# Patient Record
Sex: MALE | Race: BLACK OR AFRICAN AMERICAN | NOT HISPANIC OR LATINO | Employment: FULL TIME | URBAN - METROPOLITAN AREA
[De-identification: names, ages, dates, MRNs, and addresses within clinical notes are randomized per-mention and may not be internally consistent; named-entity substitution may affect disease eponyms.]

---

## 2022-07-31 ENCOUNTER — OFFICE VISIT (OUTPATIENT)
Dept: URGENT CARE | Facility: CLINIC | Age: 42
End: 2022-07-31
Payer: COMMERCIAL

## 2022-07-31 VITALS
OXYGEN SATURATION: 98 % | HEART RATE: 91 BPM | BODY MASS INDEX: 42.66 KG/M2 | HEIGHT: 72 IN | SYSTOLIC BLOOD PRESSURE: 135 MMHG | DIASTOLIC BLOOD PRESSURE: 91 MMHG | WEIGHT: 315 LBS | RESPIRATION RATE: 18 BRPM | TEMPERATURE: 99 F

## 2022-07-31 DIAGNOSIS — I10 HYPERTENSION, UNSPECIFIED TYPE: ICD-10-CM

## 2022-07-31 DIAGNOSIS — Z76.0 PRESCRIPTION REFILL: ICD-10-CM

## 2022-07-31 PROCEDURE — 3080F DIAST BP >= 90 MM HG: CPT | Mod: CPTII,,, | Performed by: NURSE PRACTITIONER

## 2022-07-31 PROCEDURE — 3075F SYST BP GE 130 - 139MM HG: CPT | Mod: CPTII,,, | Performed by: NURSE PRACTITIONER

## 2022-07-31 PROCEDURE — 1159F PR MEDICATION LIST DOCUMENTED IN MEDICAL RECORD: ICD-10-PCS | Mod: CPTII,,, | Performed by: NURSE PRACTITIONER

## 2022-07-31 PROCEDURE — 1159F MED LIST DOCD IN RCRD: CPT | Mod: CPTII,,, | Performed by: NURSE PRACTITIONER

## 2022-07-31 PROCEDURE — 3075F PR MOST RECENT SYSTOLIC BLOOD PRESS GE 130-139MM HG: ICD-10-PCS | Mod: CPTII,,, | Performed by: NURSE PRACTITIONER

## 2022-07-31 PROCEDURE — 4010F ACE/ARB THERAPY RXD/TAKEN: CPT | Mod: CPTII,,, | Performed by: NURSE PRACTITIONER

## 2022-07-31 PROCEDURE — 1160F RVW MEDS BY RX/DR IN RCRD: CPT | Mod: CPTII,,, | Performed by: NURSE PRACTITIONER

## 2022-07-31 PROCEDURE — 99203 OFFICE O/P NEW LOW 30 MIN: CPT | Mod: ,,, | Performed by: NURSE PRACTITIONER

## 2022-07-31 PROCEDURE — 3008F PR BODY MASS INDEX (BMI) DOCUMENTED: ICD-10-PCS | Mod: CPTII,,, | Performed by: NURSE PRACTITIONER

## 2022-07-31 PROCEDURE — 1160F PR REVIEW ALL MEDS BY PRESCRIBER/CLIN PHARMACIST DOCUMENTED: ICD-10-PCS | Mod: CPTII,,, | Performed by: NURSE PRACTITIONER

## 2022-07-31 PROCEDURE — 4010F PR ACE/ARB THEARPY RXD/TAKEN: ICD-10-PCS | Mod: CPTII,,, | Performed by: NURSE PRACTITIONER

## 2022-07-31 PROCEDURE — 3080F PR MOST RECENT DIASTOLIC BLOOD PRESSURE >= 90 MM HG: ICD-10-PCS | Mod: CPTII,,, | Performed by: NURSE PRACTITIONER

## 2022-07-31 PROCEDURE — 99203 PR OFFICE/OUTPT VISIT, NEW, LEVL III, 30-44 MIN: ICD-10-PCS | Mod: ,,, | Performed by: NURSE PRACTITIONER

## 2022-07-31 PROCEDURE — 3008F BODY MASS INDEX DOCD: CPT | Mod: CPTII,,, | Performed by: NURSE PRACTITIONER

## 2022-07-31 RX ORDER — AMLODIPINE BESYLATE 10 MG/1
10 TABLET ORAL DAILY
Qty: 30 TABLET | Refills: 1 | Status: SHIPPED | OUTPATIENT
Start: 2022-07-31 | End: 2022-08-12 | Stop reason: SDUPTHER

## 2022-07-31 RX ORDER — LISINOPRIL AND HYDROCHLOROTHIAZIDE 20; 25 MG/1; MG/1
1 TABLET ORAL DAILY
Qty: 30 TABLET | Refills: 1 | Status: SHIPPED | OUTPATIENT
Start: 2022-07-31 | End: 2022-08-12 | Stop reason: SDUPTHER

## 2022-07-31 RX ORDER — AMLODIPINE BESYLATE 10 MG/1
10 TABLET ORAL DAILY
COMMUNITY
Start: 2022-07-14 | End: 2022-08-12 | Stop reason: SDUPTHER

## 2022-07-31 RX ORDER — LISINOPRIL AND HYDROCHLOROTHIAZIDE 20; 25 MG/1; MG/1
1 TABLET ORAL DAILY
COMMUNITY
Start: 2022-07-22 | End: 2022-08-12 | Stop reason: DRUGHIGH

## 2022-07-31 NOTE — PROGRESS NOTES
Subjective:       Patient ID: Tom Philip is a 42 y.o. male.    Vitals:  height is 6' (1.829 m) and weight is 147 kg (324 lb) (abnormal). His oral temperature is 98.7 °F (37.1 °C). His blood pressure is 135/91 (abnormal) and his pulse is 91. His respiration is 18 and oxygen saturation is 98%.     Chief Complaint: Medication Refill  Patient needs a refill on blood pressure medication. He ran out of current prescriptions yesterday.     Amlodipine 10mg   Lisinopril/HCTZ 20-25mg     42-year-old male presents for prescription refill.  History of hypertension.  Asymptomatic.  Plans to see PCP in the near future.    Medication Refill  The current episode started yesterday.     ROS    Objective:      Physical Exam   Constitutional: He is oriented to person, place, and time. He appears well-developed. He is cooperative.  Non-toxic appearance. He does not appear ill. No distress.   HENT:   Head: Normocephalic and atraumatic.   Ears:   Right Ear: Hearing, tympanic membrane, external ear and ear canal normal.   Left Ear: Hearing, tympanic membrane, external ear and ear canal normal.   Nose: Nose normal. No mucosal edema, rhinorrhea or nasal deformity. No epistaxis. Right sinus exhibits no maxillary sinus tenderness and no frontal sinus tenderness. Left sinus exhibits no maxillary sinus tenderness and no frontal sinus tenderness.   Mouth/Throat: Uvula is midline, oropharynx is clear and moist and mucous membranes are normal. No trismus in the jaw. Normal dentition. No uvula swelling. No posterior oropharyngeal erythema.   Eyes: Conjunctivae and lids are normal. Right eye exhibits no discharge. Left eye exhibits no discharge. No scleral icterus.   Neck: Trachea normal and phonation normal. Neck supple.   Cardiovascular: Normal rate, regular rhythm, normal heart sounds and normal pulses.   Pulmonary/Chest: Effort normal and breath sounds normal. No respiratory distress.   Abdominal: Normal appearance and bowel sounds are normal.  He exhibits no distension and no mass. Soft. There is no abdominal tenderness.   Musculoskeletal: Normal range of motion.         General: No deformity. Normal range of motion.   Neurological: He is alert and oriented to person, place, and time. He exhibits normal muscle tone. Coordination normal.   Skin: Skin is warm, dry, intact, not diaphoretic and not pale.   Psychiatric: His speech is normal and behavior is normal. Judgment and thought content normal.   Nursing note and vitals reviewed.        Assessment:       1. Prescription refill    2. Hypertension, unspecified type          Plan:     take prescription medication as directed.  Keep a blood pressure log and follow-up with your primary care provider for future prescription refills for hypertension.    Prescription refill  -     amLODIPine (NORVASC) 10 MG tablet; Take 1 tablet (10 mg total) by mouth once daily.  Dispense: 30 tablet; Refill: 1  -     lisinopriL-hydrochlorothiazide (PRINZIDE,ZESTORETIC) 20-25 mg Tab; Take 1 tablet by mouth once daily.  Dispense: 30 tablet; Refill: 1    Hypertension, unspecified type  -     amLODIPine (NORVASC) 10 MG tablet; Take 1 tablet (10 mg total) by mouth once daily.  Dispense: 30 tablet; Refill: 1  -     lisinopriL-hydrochlorothiazide (PRINZIDE,ZESTORETIC) 20-25 mg Tab; Take 1 tablet by mouth once daily.  Dispense: 30 tablet; Refill: 1

## 2022-07-31 NOTE — PATIENT INSTRUCTIONS
take prescription medication as directed.  Keep a blood pressure log and follow-up with your primary care provider for future prescription refills for hypertension.

## 2022-08-12 ENCOUNTER — APPOINTMENT (OUTPATIENT)
Dept: LAB | Facility: HOSPITAL | Age: 42
End: 2022-08-12
Attending: NURSE PRACTITIONER
Payer: COMMERCIAL

## 2022-08-12 ENCOUNTER — OFFICE VISIT (OUTPATIENT)
Dept: FAMILY MEDICINE | Facility: CLINIC | Age: 42
End: 2022-08-12
Payer: COMMERCIAL

## 2022-08-12 VITALS
RESPIRATION RATE: 18 BRPM | TEMPERATURE: 98 F | BODY MASS INDEX: 42.66 KG/M2 | HEIGHT: 72 IN | OXYGEN SATURATION: 97 % | HEART RATE: 96 BPM | DIASTOLIC BLOOD PRESSURE: 91 MMHG | SYSTOLIC BLOOD PRESSURE: 149 MMHG | WEIGHT: 315 LBS

## 2022-08-12 DIAGNOSIS — Z00.00 WELLNESS EXAMINATION: ICD-10-CM

## 2022-08-12 DIAGNOSIS — Z72.0 TOBACCO USER: ICD-10-CM

## 2022-08-12 DIAGNOSIS — I10 HYPERTENSION, UNSPECIFIED TYPE: Primary | ICD-10-CM

## 2022-08-12 DIAGNOSIS — Z11.3 SCREENING FOR STD (SEXUALLY TRANSMITTED DISEASE): ICD-10-CM

## 2022-08-12 DIAGNOSIS — I10 HYPERTENSION, UNSPECIFIED TYPE: ICD-10-CM

## 2022-08-12 DIAGNOSIS — M79.601 RIGHT ARM PAIN: ICD-10-CM

## 2022-08-12 PROBLEM — Z76.0 PRESCRIPTION REFILL: Status: RESOLVED | Noted: 2022-07-31 | Resolved: 2022-08-12

## 2022-08-12 LAB
ALBUMIN SERPL-MCNC: 4.3 GM/DL (ref 3.5–5)
ALBUMIN/GLOB SERPL: 1.3 RATIO (ref 1.1–2)
ALP SERPL-CCNC: 33 UNIT/L (ref 40–150)
ALT SERPL-CCNC: 39 UNIT/L (ref 0–55)
AST SERPL-CCNC: 33 UNIT/L (ref 5–34)
BASOPHILS # BLD AUTO: 0.05 X10(3)/MCL (ref 0–0.2)
BASOPHILS NFR BLD AUTO: 0.9 %
BILIRUBIN DIRECT+TOT PNL SERPL-MCNC: 0.8 MG/DL
BUN SERPL-MCNC: 12.8 MG/DL (ref 8.9–20.6)
CALCIUM SERPL-MCNC: 9.7 MG/DL (ref 8.4–10.2)
CHLORIDE SERPL-SCNC: 102 MMOL/L (ref 98–107)
CHOLEST SERPL-MCNC: 228 MG/DL
CHOLEST/HDLC SERPL: 7 {RATIO} (ref 0–5)
CO2 SERPL-SCNC: 27 MMOL/L (ref 22–29)
CREAT SERPL-MCNC: 0.86 MG/DL (ref 0.73–1.18)
EOSINOPHIL # BLD AUTO: 0.05 X10(3)/MCL (ref 0–0.9)
EOSINOPHIL NFR BLD AUTO: 0.9 %
ERYTHROCYTE [DISTWIDTH] IN BLOOD BY AUTOMATED COUNT: 11.7 % (ref 11.5–17)
EST. AVERAGE GLUCOSE BLD GHB EST-MCNC: 93.9 MG/DL
GFR SERPLBLD CREATININE-BSD FMLA CKD-EPI: >60 MLS/MIN/1.73/M2
GLOBULIN SER-MCNC: 3.4 GM/DL (ref 2.4–3.5)
GLUCOSE SERPL-MCNC: 106 MG/DL (ref 74–100)
HBA1C MFR BLD: 4.9 %
HBV CORE AB SERPL QL IA: NONREACTIVE
HBV SURFACE AG SERPL QL IA: NONREACTIVE
HCT VFR BLD AUTO: 43.6 % (ref 42–52)
HDLC SERPL-MCNC: 35 MG/DL (ref 35–60)
HGB BLD-MCNC: 15.1 GM/DL (ref 14–18)
HIV 1+2 AB+HIV1 P24 AG SERPL QL IA: NONREACTIVE
IMM GRANULOCYTES # BLD AUTO: 0.01 X10(3)/MCL (ref 0–0.04)
IMM GRANULOCYTES NFR BLD AUTO: 0.2 %
LDLC SERPL CALC-MCNC: 73 MG/DL (ref 50–140)
LYMPHOCYTES # BLD AUTO: 2.24 X10(3)/MCL (ref 0.6–4.6)
LYMPHOCYTES NFR BLD AUTO: 41.3 %
MCH RBC QN AUTO: 31.9 PG (ref 27–31)
MCHC RBC AUTO-ENTMCNC: 34.6 MG/DL (ref 33–36)
MCV RBC AUTO: 92.2 FL (ref 80–94)
MONOCYTES # BLD AUTO: 0.48 X10(3)/MCL (ref 0.1–1.3)
MONOCYTES NFR BLD AUTO: 8.9 %
NEUTROPHILS # BLD AUTO: 2.6 X10(3)/MCL (ref 2.1–9.2)
NEUTROPHILS NFR BLD AUTO: 47.8 %
NRBC BLD AUTO-RTO: 0 %
PLATELET # BLD AUTO: 241 X10(3)/MCL (ref 130–400)
PMV BLD AUTO: 9.8 FL (ref 7.4–10.4)
POTASSIUM SERPL-SCNC: 3.5 MMOL/L (ref 3.5–5.1)
PROT SERPL-MCNC: 7.7 GM/DL (ref 6.4–8.3)
RBC # BLD AUTO: 4.73 X10(6)/MCL (ref 4.7–6.1)
SODIUM SERPL-SCNC: 142 MMOL/L (ref 136–145)
TRIGL SERPL-MCNC: 600 MG/DL (ref 34–140)
TSH SERPL-ACNC: 0.88 UIU/ML (ref 0.35–4.94)
VLDLC SERPL CALC-MCNC: 120 MG/DL
WBC # SPEC AUTO: 5.4 X10(3)/MCL (ref 4.5–11.5)

## 2022-08-12 PROCEDURE — 3008F BODY MASS INDEX DOCD: CPT | Mod: CPTII,,, | Performed by: NURSE PRACTITIONER

## 2022-08-12 PROCEDURE — 86704 HEP B CORE ANTIBODY TOTAL: CPT

## 2022-08-12 PROCEDURE — 3080F PR MOST RECENT DIASTOLIC BLOOD PRESSURE >= 90 MM HG: ICD-10-PCS | Mod: CPTII,,, | Performed by: NURSE PRACTITIONER

## 2022-08-12 PROCEDURE — 80061 LIPID PANEL: CPT

## 2022-08-12 PROCEDURE — 1159F MED LIST DOCD IN RCRD: CPT | Mod: CPTII,,, | Performed by: NURSE PRACTITIONER

## 2022-08-12 PROCEDURE — 4010F PR ACE/ARB THEARPY RXD/TAKEN: ICD-10-PCS | Mod: CPTII,,, | Performed by: NURSE PRACTITIONER

## 2022-08-12 PROCEDURE — 3077F SYST BP >= 140 MM HG: CPT | Mod: CPTII,,, | Performed by: NURSE PRACTITIONER

## 2022-08-12 PROCEDURE — 80053 COMPREHEN METABOLIC PANEL: CPT

## 2022-08-12 PROCEDURE — 85025 COMPLETE CBC W/AUTO DIFF WBC: CPT

## 2022-08-12 PROCEDURE — 1159F PR MEDICATION LIST DOCUMENTED IN MEDICAL RECORD: ICD-10-PCS | Mod: CPTII,,, | Performed by: NURSE PRACTITIONER

## 2022-08-12 PROCEDURE — 3077F PR MOST RECENT SYSTOLIC BLOOD PRESSURE >= 140 MM HG: ICD-10-PCS | Mod: CPTII,,, | Performed by: NURSE PRACTITIONER

## 2022-08-12 PROCEDURE — 3080F DIAST BP >= 90 MM HG: CPT | Mod: CPTII,,, | Performed by: NURSE PRACTITIONER

## 2022-08-12 PROCEDURE — 1160F PR REVIEW ALL MEDS BY PRESCRIBER/CLIN PHARMACIST DOCUMENTED: ICD-10-PCS | Mod: CPTII,,, | Performed by: NURSE PRACTITIONER

## 2022-08-12 PROCEDURE — 87389 HIV-1 AG W/HIV-1&-2 AB AG IA: CPT

## 2022-08-12 PROCEDURE — 99214 PR OFFICE/OUTPT VISIT, EST, LEVL IV, 30-39 MIN: ICD-10-PCS | Mod: ,,, | Performed by: NURSE PRACTITIONER

## 2022-08-12 PROCEDURE — 4010F ACE/ARB THERAPY RXD/TAKEN: CPT | Mod: CPTII,,, | Performed by: NURSE PRACTITIONER

## 2022-08-12 PROCEDURE — 84443 ASSAY THYROID STIM HORMONE: CPT

## 2022-08-12 PROCEDURE — 99214 OFFICE O/P EST MOD 30 MIN: CPT | Mod: ,,, | Performed by: NURSE PRACTITIONER

## 2022-08-12 PROCEDURE — 36415 COLL VENOUS BLD VENIPUNCTURE: CPT

## 2022-08-12 PROCEDURE — 3008F PR BODY MASS INDEX (BMI) DOCUMENTED: ICD-10-PCS | Mod: CPTII,,, | Performed by: NURSE PRACTITIONER

## 2022-08-12 PROCEDURE — 1160F RVW MEDS BY RX/DR IN RCRD: CPT | Mod: CPTII,,, | Performed by: NURSE PRACTITIONER

## 2022-08-12 PROCEDURE — 87340 HEPATITIS B SURFACE AG IA: CPT

## 2022-08-12 PROCEDURE — 83036 HEMOGLOBIN GLYCOSYLATED A1C: CPT

## 2022-08-12 RX ORDER — MELOXICAM 15 MG/1
15 TABLET ORAL DAILY
Qty: 30 TABLET | Refills: 0 | Status: SHIPPED | OUTPATIENT
Start: 2022-08-12 | End: 2022-08-22 | Stop reason: SDUPTHER

## 2022-08-12 RX ORDER — IBUPROFEN 800 MG/1
800 TABLET ORAL EVERY 8 HOURS PRN
COMMUNITY
Start: 2022-05-02 | End: 2022-08-22 | Stop reason: ALTCHOICE

## 2022-08-12 RX ORDER — AMLODIPINE BESYLATE 10 MG/1
10 TABLET ORAL DAILY
Qty: 90 TABLET | Refills: 1 | Status: SHIPPED | OUTPATIENT
Start: 2022-08-12 | End: 2023-04-24 | Stop reason: SDUPTHER

## 2022-08-12 RX ORDER — LISINOPRIL AND HYDROCHLOROTHIAZIDE 12.5; 2 MG/1; MG/1
2 TABLET ORAL DAILY
Qty: 180 TABLET | Refills: 1 | Status: SHIPPED | OUTPATIENT
Start: 2022-08-12 | End: 2023-02-14

## 2022-08-12 NOTE — ASSESSMENT & PLAN NOTE
BP elevated; Asymptomatic   Currently taking Amlodipine 10 mg po daily and Lisinopril/HCTZ 20-12.5 mg po daily.  Continue Amlodipine  Increase Lisinopril/HCTZ to 20/12.5 (two tablets) daily  Instructed to take BP meds prior to all clinic appointments  Instructed to report to ED for any BP >200/100, SOB, CP, and/or worsening symptoms  Daily BP check; bring log to all appointments  F/U in 1 week for reevaluation/wellness  Verbalizes all understanding

## 2022-08-12 NOTE — PROGRESS NOTES
Subjective:      Patient ID: Tom Philip is a 42 y.o. Black or  male      Chief Complaint: Follow-up (F/u visit, rt arm issue)       Past Medical History:   Diagnosis Date    Hypertension         HPI  Presents to clinic for evaluation.  Wishes to establish care with [facility] provider.      HTN:  HTN for many years.  BP elevated today. Currently taking Amlodipine 10 mg po daily and Lisinopril/HCTZ 20-12.5 mg po daily.  States he has been taking BP medication x 5 years and states DBP stays in 90's, without any improvement.  Denies any headaches, weakness, dizziness, CP, or SOB.        Arm pain:  C/O of right forearm pain, near elbow, x 2 months.  Describes as stiffness first thing in the morning.  Denies any numbness or tingling.  He is requesting anti-inflammatory.      Tobacco User:  Smokes cigarettes; 1 pack per week.  Denies any wheezing, cough, or SOB.    Morbid Obesity:  States he exercises on weekend.          Review of Systems   Constitutional: Negative for chills, fatigue, fever and unexpected weight change.   HENT: Negative.    Eyes: Negative.    Respiratory: Negative.  Negative for shortness of breath.    Cardiovascular: Negative.  Negative for chest pain.   Gastrointestinal: Negative.    Endocrine: Negative.    Genitourinary: Negative.    Musculoskeletal: Negative.    Integumentary:  Negative.   Allergic/Immunologic: Negative.    Neurological: Negative.  Negative for weakness.   Hematological: Negative.    Psychiatric/Behavioral: Negative.    All other systems reviewed and are negative.         Objective:      Vitals:    08/12/22 0933   BP: (!) 149/91   Pulse: 96   Resp:    Temp:       Body mass index is 44.29 kg/m².     Physical Exam  Vitals reviewed.   Constitutional:       Appearance: He is not toxic-appearing.   HENT:      Head: Normocephalic.      Mouth/Throat:      Mouth: Mucous membranes are moist.   Eyes:      Extraocular Movements: Extraocular movements intact.      Pupils: Pupils  are equal, round, and reactive to light.   Cardiovascular:      Rate and Rhythm: Normal rate and regular rhythm.      Pulses: Normal pulses.      Heart sounds: Normal heart sounds.   Pulmonary:      Effort: Pulmonary effort is normal. No respiratory distress.      Breath sounds: Normal breath sounds.   Abdominal:      General: Bowel sounds are normal. There is no distension.      Palpations: Abdomen is soft.      Tenderness: There is no abdominal tenderness.   Musculoskeletal:         General: No tenderness. Normal range of motion.      Cervical back: Neck supple.   Skin:     General: Skin is warm and dry.   Neurological:      Mental Status: He is alert and oriented to person, place, and time.   Psychiatric:         Mood and Affect: Mood normal.            Current Outpatient Medications:     ibuprofen (ADVIL,MOTRIN) 800 MG tablet, Take 800 mg by mouth every 8 (eight) hours as needed., Disp: , Rfl:     amLODIPine (NORVASC) 10 MG tablet, Take 1 tablet (10 mg total) by mouth once daily., Disp: 90 tablet, Rfl: 1    lisinopriL-hydrochlorothiazide (PRINZIDE,ZESTORETIC) 20-12.5 mg per tablet, Take 2 tablets by mouth once daily., Disp: 180 tablet, Rfl: 1    meloxicam (MOBIC) 15 MG tablet, Take 1 tablet (15 mg total) by mouth once daily., Disp: 30 tablet, Rfl: 0    Assessment & Plan:     Problem List Items Addressed This Visit        Cardiac/Vascular    HTN (hypertension) - Primary     BP elevated; Asymptomatic   Currently taking Amlodipine 10 mg po daily and Lisinopril/HCTZ 20-12.5 mg po daily.  Continue Amlodipine  Increase Lisinopril/HCTZ to 20/12.5 (two tablets) daily  Instructed to take BP meds prior to all clinic appointments  Instructed to report to ED for any BP >200/100, SOB, CP, and/or worsening symptoms  Daily BP check; bring log to all appointments  F/U in 1 week for reevaluation/wellness  Verbalizes all understanding             Relevant Medications    lisinopriL-hydrochlorothiazide (PRINZIDE,ZESTORETIC)  20-12.5 mg per tablet    amLODIPine (NORVASC) 10 MG tablet    Other Relevant Orders    CBC Auto Differential    Comprehensive Metabolic Panel    TSH    Urinalysis, Reflex to Urine Culture Urine, Clean Catch    Hemoglobin A1C    Lipid Panel       ID    Screening for STD (sexually transmitted disease)     Labs today           Relevant Orders    HIV 1/2 Ag/Ab (4th Gen)    Hepatitis B Surface Antigen    Hepatitis B Core Antibody, Total    C.trach/N.gonor AMP RNA, (Non-Genital)       Orthopedic    Right arm pain     Rx Meloxicam   F/U if no improvement           Relevant Medications    meloxicam (MOBIC) 15 MG tablet       Other    Tobacco user     Educated on smoking cessation           Relevant Orders    HIV 1/2 Ag/Ab (4th Gen)    Hepatitis B Surface Antigen    Hepatitis B Core Antibody, Total    C.trach/N.gonor AMP RNA, (Non-Genital)      Other Visit Diagnoses     Wellness examination

## 2022-08-22 ENCOUNTER — OFFICE VISIT (OUTPATIENT)
Dept: FAMILY MEDICINE | Facility: CLINIC | Age: 42
End: 2022-08-22
Payer: COMMERCIAL

## 2022-08-22 VITALS
BODY MASS INDEX: 42.66 KG/M2 | HEART RATE: 88 BPM | OXYGEN SATURATION: 97 % | HEIGHT: 72 IN | WEIGHT: 315 LBS | SYSTOLIC BLOOD PRESSURE: 150 MMHG | TEMPERATURE: 98 F | RESPIRATION RATE: 18 BRPM | DIASTOLIC BLOOD PRESSURE: 92 MMHG

## 2022-08-22 DIAGNOSIS — I10 HYPERTENSION, UNSPECIFIED TYPE: ICD-10-CM

## 2022-08-22 DIAGNOSIS — Z72.0 TOBACCO USER: ICD-10-CM

## 2022-08-22 DIAGNOSIS — E66.01 MORBID OBESITY: ICD-10-CM

## 2022-08-22 DIAGNOSIS — E78.1 HYPERTRIGLYCERIDEMIA: ICD-10-CM

## 2022-08-22 DIAGNOSIS — Z00.00 WELLNESS EXAMINATION: Primary | ICD-10-CM

## 2022-08-22 DIAGNOSIS — M79.601 RIGHT ARM PAIN: ICD-10-CM

## 2022-08-22 DIAGNOSIS — E78.5 HYPERLIPIDEMIA, UNSPECIFIED HYPERLIPIDEMIA TYPE: ICD-10-CM

## 2022-08-22 PROCEDURE — 3080F DIAST BP >= 90 MM HG: CPT | Mod: CPTII,,, | Performed by: NURSE PRACTITIONER

## 2022-08-22 PROCEDURE — 1160F PR REVIEW ALL MEDS BY PRESCRIBER/CLIN PHARMACIST DOCUMENTED: ICD-10-PCS | Mod: CPTII,,, | Performed by: NURSE PRACTITIONER

## 2022-08-22 PROCEDURE — 3077F PR MOST RECENT SYSTOLIC BLOOD PRESSURE >= 140 MM HG: ICD-10-PCS | Mod: CPTII,,, | Performed by: NURSE PRACTITIONER

## 2022-08-22 PROCEDURE — 99396 PREV VISIT EST AGE 40-64: CPT | Mod: ,,, | Performed by: NURSE PRACTITIONER

## 2022-08-22 PROCEDURE — 1160F RVW MEDS BY RX/DR IN RCRD: CPT | Mod: CPTII,,, | Performed by: NURSE PRACTITIONER

## 2022-08-22 PROCEDURE — 3077F SYST BP >= 140 MM HG: CPT | Mod: CPTII,,, | Performed by: NURSE PRACTITIONER

## 2022-08-22 PROCEDURE — 1159F MED LIST DOCD IN RCRD: CPT | Mod: CPTII,,, | Performed by: NURSE PRACTITIONER

## 2022-08-22 PROCEDURE — 1159F PR MEDICATION LIST DOCUMENTED IN MEDICAL RECORD: ICD-10-PCS | Mod: CPTII,,, | Performed by: NURSE PRACTITIONER

## 2022-08-22 PROCEDURE — 4010F PR ACE/ARB THEARPY RXD/TAKEN: ICD-10-PCS | Mod: CPTII,,, | Performed by: NURSE PRACTITIONER

## 2022-08-22 PROCEDURE — 99396 PR PREVENTIVE VISIT,EST,40-64: ICD-10-PCS | Mod: ,,, | Performed by: NURSE PRACTITIONER

## 2022-08-22 PROCEDURE — 4010F ACE/ARB THERAPY RXD/TAKEN: CPT | Mod: CPTII,,, | Performed by: NURSE PRACTITIONER

## 2022-08-22 PROCEDURE — 3008F BODY MASS INDEX DOCD: CPT | Mod: CPTII,,, | Performed by: NURSE PRACTITIONER

## 2022-08-22 PROCEDURE — 3080F PR MOST RECENT DIASTOLIC BLOOD PRESSURE >= 90 MM HG: ICD-10-PCS | Mod: CPTII,,, | Performed by: NURSE PRACTITIONER

## 2022-08-22 PROCEDURE — 3008F PR BODY MASS INDEX (BMI) DOCUMENTED: ICD-10-PCS | Mod: CPTII,,, | Performed by: NURSE PRACTITIONER

## 2022-08-22 RX ORDER — METOPROLOL SUCCINATE 25 MG/1
25 TABLET, EXTENDED RELEASE ORAL DAILY
Qty: 90 TABLET | Refills: 1 | Status: SHIPPED | OUTPATIENT
Start: 2022-08-22 | End: 2023-06-12 | Stop reason: SDUPTHER

## 2022-08-22 RX ORDER — MELOXICAM 15 MG/1
15 TABLET ORAL DAILY
Qty: 30 TABLET | Refills: 0 | Status: SHIPPED | OUTPATIENT
Start: 2022-08-22 | End: 2022-09-21

## 2022-08-22 RX ORDER — ATORVASTATIN CALCIUM 40 MG/1
40 TABLET, FILM COATED ORAL DAILY
Qty: 90 TABLET | Refills: 1 | Status: SHIPPED | OUTPATIENT
Start: 2022-08-22 | End: 2023-06-26 | Stop reason: SDUPTHER

## 2022-08-22 NOTE — ASSESSMENT & PLAN NOTE
Total cholesterol 228; Triglycerides 600; LDL 73.   16.7% 10-year risk of heart attack and stroke per ACC/AHA guidelines; Needs high intensity statin  Rx Atorvastatin 40 mg po daily  Start OTC Fish Oil 1200 mg BID  Repeat labs in 3 months;

## 2022-08-22 NOTE — ASSESSMENT & PLAN NOTE
Labs previously done and reviewed with patient; U/A not done  Covid-19 vaccine (2021); Booster due  Tdap; due; Refuses  Colon cancer screening:  Due next year  Alcohol use:  Drink 3 beers daily; Fany on weekends  Tobacco User:  Smokes cigarettes; 1 pack per week.  Denies any wheezing, cough, or SOB  Morbid Obesity:  States he exercises on weekend

## 2022-08-22 NOTE — ASSESSMENT & PLAN NOTE
Elevated; Asymptomatic  Currently taking Amlodipine 10 mg po daily and Lisinopril/HCTZ 20-12.5 mg (two tablets) po daily  Continue Amlodipine and Lisinopril/HCTZ  Rx Metoprolol ER 25 mg po daily  Daily BPcheck  Instructed to report to ED for any BP >200/100, SOB, CP, and/or worsening symptoms  Keep appt for follow up  Verbalizes all understanding

## 2022-08-22 NOTE — PROGRESS NOTES
Subjective:      Patient ID: Tom Philip is a 42 y.o. Black or  male      Chief Complaint: Follow-up (wellness)       Past Medical History:   Diagnosis Date    HLD (hyperlipidemia)     Hypertension     Hypertriglyceridemia     Morbid obesity         HPI  Presents to clinic for Annual Wellness.  Wishes to establish care with [facility] provider.     Covid-19 vaccine (2021); Booster due  Tdap; due  Colon cancer screening:  Due next year  Alcohol use:  Drink 3 beers daily; Fany on weekends  Tobacco User:  Smokes cigarettes; 1 pack per week.  Denies any wheezing, cough, or SOB  Morbid Obesity:  States he exercises on weekend  Labs previously done and reviewed; U/A not done     HTN:  HTN for many years.  BP elevated at previous visit.  Medication changes were made. Currently taking Amlodipine 10 mg po daily and Lisinopril/HCTZ 20-12.5 mg (two tablets) po daily.  Denies any headaches, weakness, dizziness, CP, or SOB.         HDL/Triglycerides: Total cholesterol 228; Triglycerides 600; LDL 73.  16.7% 10-year risk of heart attack and stroke per ACC/AHA guidelines; Needs high intensity statin    Arm pain:  Improved with Meloxicam.  Desires refills.        Review of Systems   Constitutional: Negative for chills, fatigue, fever and unexpected weight change.   HENT: Negative.    Eyes: Negative.    Respiratory: Negative.  Negative for shortness of breath.    Cardiovascular: Negative.  Negative for chest pain.   Gastrointestinal: Negative.    Endocrine: Negative.    Genitourinary: Negative.    Musculoskeletal: Negative.    Integumentary:  Negative.   Allergic/Immunologic: Negative.    Neurological: Negative.  Negative for weakness.   Hematological: Negative.    Psychiatric/Behavioral: Negative.    All other systems reviewed and are negative.         Objective:      Vitals:    08/22/22 1451   BP: (!) 150/92   Pulse:    Resp:    Temp:       Body mass index is 44.34 kg/m².     Physical Exam  Vitals reviewed.    Constitutional:       Appearance: He is not toxic-appearing.   HENT:      Head: Normocephalic.      Mouth/Throat:      Mouth: Mucous membranes are moist.   Eyes:      Extraocular Movements: Extraocular movements intact.      Pupils: Pupils are equal, round, and reactive to light.   Cardiovascular:      Rate and Rhythm: Normal rate and regular rhythm.      Pulses: Normal pulses.      Heart sounds: Normal heart sounds.   Pulmonary:      Effort: Pulmonary effort is normal. No respiratory distress.      Breath sounds: Normal breath sounds.   Abdominal:      General: Bowel sounds are normal. There is no distension.      Palpations: Abdomen is soft.      Tenderness: There is no abdominal tenderness.   Musculoskeletal:         General: No tenderness. Normal range of motion.      Cervical back: Neck supple.   Skin:     General: Skin is warm and dry.   Neurological:      Mental Status: He is alert and oriented to person, place, and time.   Psychiatric:         Mood and Affect: Mood normal.            Current Outpatient Medications:     amLODIPine (NORVASC) 10 MG tablet, Take 1 tablet (10 mg total) by mouth once daily., Disp: 90 tablet, Rfl: 1    lisinopriL-hydrochlorothiazide (PRINZIDE,ZESTORETIC) 20-12.5 mg per tablet, Take 2 tablets by mouth once daily., Disp: 180 tablet, Rfl: 1    atorvastatin (LIPITOR) 40 MG tablet, Take 1 tablet (40 mg total) by mouth once daily., Disp: 90 tablet, Rfl: 1    meloxicam (MOBIC) 15 MG tablet, Take 1 tablet (15 mg total) by mouth once daily., Disp: 30 tablet, Rfl: 0    metoprolol succinate (TOPROL-XL) 25 MG 24 hr tablet, Take 1 tablet (25 mg total) by mouth once daily., Disp: 90 tablet, Rfl: 1    Assessment & Plan:     Problem List Items Addressed This Visit        Cardiac/Vascular    Hypertension     Elevated; Asymptomatic  Currently taking Amlodipine 10 mg po daily and Lisinopril/HCTZ 20-12.5 mg (two tablets) po daily  Continue Amlodipine and Lisinopril/HCTZ  Rx Metoprolol ER 25 mg  po daily  Daily BPcheck  Instructed to report to ED for any BP >200/100, SOB, CP, and/or worsening symptoms  Keep appt for follow up  Verbalizes all understanding             Relevant Medications    metoprolol succinate (TOPROL-XL) 25 MG 24 hr tablet    Hypertriglyceridemia     Total cholesterol 228; Triglycerides 600; LDL 73.   16.7% 10-year risk of heart attack and stroke per ACC/AHA guidelines; Needs high intensity statin  Rx Atorvastatin 40 mg po daily  Start OTC Fish Oil 1200 mg BID  Repeat labs in 3 months;             Relevant Medications    atorvastatin (LIPITOR) 40 MG tablet    Other Relevant Orders    Comprehensive Metabolic Panel    Lipid Panel    Hyperlipidemia     Total cholesterol 228; Triglycerides 600; LDL 73.   16.7% 10-year risk of heart attack and stroke per ACC/AHA guidelines; Needs high intensity statin  Rx Atorvastatin 40 mg po daily  Start OTC Fish Oil 1200 mg BID  Repeat labs in 3 months;           Relevant Medications    atorvastatin (LIPITOR) 40 MG tablet    Other Relevant Orders    Comprehensive Metabolic Panel    Lipid Panel       Endocrine    Morbid obesity     Educated on aerobic exercise 4-5 times per day x 40 minutes per day              Orthopedic    Right arm pain     Improved  Continue Mobic prn           Relevant Medications    meloxicam (MOBIC) 15 MG tablet       Other    Tobacco user     Educated on smoking cessation  Refuses class           Wellness examination - Primary     Labs previously done and reviewed with patient; U/A not done  Covid-19 vaccine (2021); Booster due  Tdap; due; Refuses  Colon cancer screening:  Due next year  Alcohol use:  Drink 3 beers daily; Fany on weekends  Tobacco User:  Smokes cigarettes; 1 pack per week.  Denies any wheezing, cough, or SOB  Morbid Obesity:  States he exercises on weekend

## 2022-11-21 PROBLEM — Z00.00 WELLNESS EXAMINATION: Status: RESOLVED | Noted: 2022-08-22 | Resolved: 2022-11-21

## 2023-04-24 ENCOUNTER — TELEPHONE (OUTPATIENT)
Dept: FAMILY MEDICINE | Facility: CLINIC | Age: 43
End: 2023-04-24
Payer: COMMERCIAL

## 2023-04-24 DIAGNOSIS — I10 HYPERTENSION, UNSPECIFIED TYPE: ICD-10-CM

## 2023-04-24 RX ORDER — AMLODIPINE BESYLATE 10 MG/1
10 TABLET ORAL DAILY
Qty: 90 TABLET | Refills: 1 | Status: SHIPPED | OUTPATIENT
Start: 2023-04-24 | End: 2023-06-12 | Stop reason: SDUPTHER

## 2023-05-19 DIAGNOSIS — I10 HYPERTENSION, UNSPECIFIED TYPE: ICD-10-CM

## 2023-05-19 RX ORDER — LISINOPRIL AND HYDROCHLOROTHIAZIDE 12.5; 2 MG/1; MG/1
2 TABLET ORAL DAILY
Qty: 60 TABLET | Refills: 0 | Status: SHIPPED | OUTPATIENT
Start: 2023-05-19 | End: 2023-06-09

## 2023-06-09 DIAGNOSIS — I10 HYPERTENSION, UNSPECIFIED TYPE: ICD-10-CM

## 2023-06-09 RX ORDER — LISINOPRIL AND HYDROCHLOROTHIAZIDE 12.5; 2 MG/1; MG/1
2 TABLET ORAL DAILY
Qty: 60 TABLET | Refills: 0 | Status: SHIPPED | OUTPATIENT
Start: 2023-06-09 | End: 2023-06-12 | Stop reason: SDUPTHER

## 2023-06-12 ENCOUNTER — OFFICE VISIT (OUTPATIENT)
Dept: FAMILY MEDICINE | Facility: CLINIC | Age: 43
End: 2023-06-12
Payer: COMMERCIAL

## 2023-06-12 VITALS
HEIGHT: 72 IN | BODY MASS INDEX: 42.66 KG/M2 | RESPIRATION RATE: 18 BRPM | TEMPERATURE: 99 F | SYSTOLIC BLOOD PRESSURE: 150 MMHG | DIASTOLIC BLOOD PRESSURE: 92 MMHG | OXYGEN SATURATION: 96 % | WEIGHT: 315 LBS | HEART RATE: 86 BPM

## 2023-06-12 DIAGNOSIS — I10 PRIMARY HYPERTENSION: Primary | ICD-10-CM

## 2023-06-12 DIAGNOSIS — I10 HYPERTENSION, UNSPECIFIED TYPE: ICD-10-CM

## 2023-06-12 DIAGNOSIS — Z12.11 COLON CANCER SCREENING: ICD-10-CM

## 2023-06-12 PROCEDURE — 1160F PR REVIEW ALL MEDS BY PRESCRIBER/CLIN PHARMACIST DOCUMENTED: ICD-10-PCS | Mod: CPTII,,, | Performed by: NURSE PRACTITIONER

## 2023-06-12 PROCEDURE — 3077F SYST BP >= 140 MM HG: CPT | Mod: CPTII,,, | Performed by: NURSE PRACTITIONER

## 2023-06-12 PROCEDURE — 4010F ACE/ARB THERAPY RXD/TAKEN: CPT | Mod: CPTII,,, | Performed by: NURSE PRACTITIONER

## 2023-06-12 PROCEDURE — 99214 OFFICE O/P EST MOD 30 MIN: CPT | Mod: ,,, | Performed by: NURSE PRACTITIONER

## 2023-06-12 PROCEDURE — 1159F PR MEDICATION LIST DOCUMENTED IN MEDICAL RECORD: ICD-10-PCS | Mod: CPTII,,, | Performed by: NURSE PRACTITIONER

## 2023-06-12 PROCEDURE — 99214 PR OFFICE/OUTPT VISIT, EST, LEVL IV, 30-39 MIN: ICD-10-PCS | Mod: ,,, | Performed by: NURSE PRACTITIONER

## 2023-06-12 PROCEDURE — 3080F DIAST BP >= 90 MM HG: CPT | Mod: CPTII,,, | Performed by: NURSE PRACTITIONER

## 2023-06-12 PROCEDURE — 3008F PR BODY MASS INDEX (BMI) DOCUMENTED: ICD-10-PCS | Mod: CPTII,,, | Performed by: NURSE PRACTITIONER

## 2023-06-12 PROCEDURE — 1160F RVW MEDS BY RX/DR IN RCRD: CPT | Mod: CPTII,,, | Performed by: NURSE PRACTITIONER

## 2023-06-12 PROCEDURE — 3077F PR MOST RECENT SYSTOLIC BLOOD PRESSURE >= 140 MM HG: ICD-10-PCS | Mod: CPTII,,, | Performed by: NURSE PRACTITIONER

## 2023-06-12 PROCEDURE — 1159F MED LIST DOCD IN RCRD: CPT | Mod: CPTII,,, | Performed by: NURSE PRACTITIONER

## 2023-06-12 PROCEDURE — 3080F PR MOST RECENT DIASTOLIC BLOOD PRESSURE >= 90 MM HG: ICD-10-PCS | Mod: CPTII,,, | Performed by: NURSE PRACTITIONER

## 2023-06-12 PROCEDURE — 4010F PR ACE/ARB THEARPY RXD/TAKEN: ICD-10-PCS | Mod: CPTII,,, | Performed by: NURSE PRACTITIONER

## 2023-06-12 PROCEDURE — 3008F BODY MASS INDEX DOCD: CPT | Mod: CPTII,,, | Performed by: NURSE PRACTITIONER

## 2023-06-12 RX ORDER — CLONIDINE HYDROCHLORIDE 0.1 MG/1
0.1 TABLET ORAL
Status: COMPLETED | OUTPATIENT
Start: 2023-06-12 | End: 2023-06-12

## 2023-06-12 RX ORDER — LISINOPRIL AND HYDROCHLOROTHIAZIDE 12.5; 2 MG/1; MG/1
2 TABLET ORAL DAILY
Qty: 180 TABLET | Refills: 1 | Status: SHIPPED | OUTPATIENT
Start: 2023-06-12 | End: 2023-08-14 | Stop reason: SDUPTHER

## 2023-06-12 RX ORDER — METOPROLOL SUCCINATE 25 MG/1
25 TABLET, EXTENDED RELEASE ORAL DAILY
Qty: 90 TABLET | Refills: 0 | Status: SHIPPED | OUTPATIENT
Start: 2023-06-12 | End: 2023-06-26 | Stop reason: SDUPTHER

## 2023-06-12 RX ORDER — AMLODIPINE BESYLATE 10 MG/1
10 TABLET ORAL DAILY
Qty: 90 TABLET | Refills: 1 | Status: SHIPPED | OUTPATIENT
Start: 2023-06-12 | End: 2023-08-14 | Stop reason: SDUPTHER

## 2023-06-12 RX ADMIN — CLONIDINE HYDROCHLORIDE 0.1 MG: 0.1 TABLET ORAL at 10:06

## 2023-06-12 NOTE — PROGRESS NOTES
Subjective:      Patient ID: Tom Philip is a 43 y.o. Black or  male      Chief Complaint: Medication Refill (Lisinopril-hydrochlorothiazide, Amlodipine)       Past Medical History:   Diagnosis Date    HLD (hyperlipidemia)     Hypertension     Hypertriglyceridemia     Morbid obesity         HPI  Presents to clinic for follow up HTN/Medication refills.    HTN:  HTN for many years.  BP elevated today and at previous visit.  States 's/90's a home.  Took BP meds right before appt.  Currently taking Amlodipine 10 mg po daily and Lisinopril/HCTZ 20-12.5 mg (two tablets) po daily.  He did not start Metoprolol at previously prescribed.  States compliance with medications.  Denies any headaches, weakness, dizziness, CP, or SOB.        Colon cancer screening due; Cologuard ordered      Review of Systems   Constitutional:  Negative for chills, fatigue, fever and unexpected weight change.   HENT: Negative.     Eyes: Negative.    Respiratory: Negative.  Negative for shortness of breath.    Cardiovascular: Negative.  Negative for chest pain.   Gastrointestinal: Negative.    Endocrine: Negative.    Genitourinary: Negative.    Musculoskeletal: Negative.    Integumentary:  Negative.   Allergic/Immunologic: Negative.    Neurological: Negative.  Negative for weakness.   Hematological: Negative.    Psychiatric/Behavioral: Negative.     All other systems reviewed and are negative.       Objective:      Vitals:    06/12/23 1043   BP: (!) 150/92   Pulse:    Resp:    Temp:       Body mass index is 42.76 kg/m².     Physical Exam  Vitals reviewed.   Constitutional:       Appearance: He is not toxic-appearing.   HENT:      Head: Normocephalic.      Mouth/Throat:      Mouth: Mucous membranes are moist.   Eyes:      Extraocular Movements: Extraocular movements intact.      Pupils: Pupils are equal, round, and reactive to light.   Cardiovascular:      Rate and Rhythm: Normal rate and regular rhythm.      Pulses: Normal  pulses.      Heart sounds: Normal heart sounds.   Pulmonary:      Effort: Pulmonary effort is normal. No respiratory distress.      Breath sounds: Normal breath sounds.   Abdominal:      General: Bowel sounds are normal. There is no distension.      Palpations: Abdomen is soft.      Tenderness: There is no abdominal tenderness.   Musculoskeletal:         General: No tenderness. Normal range of motion.      Cervical back: Neck supple.   Skin:     General: Skin is warm and dry.   Neurological:      Mental Status: He is alert and oriented to person, place, and time.   Psychiatric:         Mood and Affect: Mood normal.          Current Outpatient Medications:     atorvastatin (LIPITOR) 40 MG tablet, Take 1 tablet (40 mg total) by mouth once daily., Disp: 90 tablet, Rfl: 1    amLODIPine (NORVASC) 10 MG tablet, Take 1 tablet (10 mg total) by mouth once daily., Disp: 90 tablet, Rfl: 1    lisinopriL-hydrochlorothiazide (PRINZIDE,ZESTORETIC) 20-12.5 mg per tablet, Take 2 tablets by mouth once daily., Disp: 180 tablet, Rfl: 1    metoprolol succinate (TOPROL-XL) 25 MG 24 hr tablet, Take 1 tablet (25 mg total) by mouth once daily., Disp: 90 tablet, Rfl: 0  No current facility-administered medications for this visit.    Assessment & Plan:     Problem List Items Addressed This Visit          Cardiac/Vascular    HTN (hypertension) - Primary     BP elevated; Asymptomatic  Took BP meds right before appt  Clonidine 0.1 mg given in office; BP repeated in 30 minutes    Currently taking Amlodipine 10 mg po daily and Lisinopril/HCTZ 20-12.5 mg (two tablets) po daily.  He did not start Metoprolol at previously prescribed  Continue Amlodipine and Lisinopril/HCTZ as prescribed  Rx Metoprolol XL 25 mg po daily  Instructed to take BP meds(few hours) prior to all clinic appointments  Instructed to report to ED for any BP >200/100, SOB, CP, and/or worsening symptoms  F/U in 1 month  Daily BP check; bring log to all appointments  Keep appt for  follow up  Verbalizes all understanding           Relevant Medications    amLODIPine (NORVASC) 10 MG tablet    lisinopriL-hydrochlorothiazide (PRINZIDE,ZESTORETIC) 20-12.5 mg per tablet    metoprolol succinate (TOPROL-XL) 25 MG 24 hr tablet    cloNIDine tablet 0.1 mg (Completed)    Hypertension    Relevant Medications    amLODIPine (NORVASC) 10 MG tablet    lisinopriL-hydrochlorothiazide (PRINZIDE,ZESTORETIC) 20-12.5 mg per tablet    metoprolol succinate (TOPROL-XL) 25 MG 24 hr tablet       GI    Colon cancer screening    Relevant Orders    Cologuard Screening (Multitarget Stool DNA)

## 2023-06-12 NOTE — ASSESSMENT & PLAN NOTE
BP elevated; Asymptomatic  Took BP meds right before appt  Clonidine 0.1 mg given in office; BP repeated in 30 minutes    Currently taking Amlodipine 10 mg po daily and Lisinopril/HCTZ 20-12.5 mg (two tablets) po daily.  He did not start Metoprolol at previously prescribed  Continue Amlodipine and Lisinopril/HCTZ as prescribed  Rx Metoprolol XL 25 mg po daily  Instructed to take BP meds(few hours) prior to all clinic appointments  Instructed to report to ED for any BP >200/100, SOB, CP, and/or worsening symptoms  F/U in 1 month  Daily BP check; bring log to all appointments  Keep appt for follow up  Verbalizes all understanding

## 2023-06-26 ENCOUNTER — OFFICE VISIT (OUTPATIENT)
Dept: FAMILY MEDICINE | Facility: CLINIC | Age: 43
End: 2023-06-26
Payer: COMMERCIAL

## 2023-06-26 VITALS
SYSTOLIC BLOOD PRESSURE: 129 MMHG | BODY MASS INDEX: 42.66 KG/M2 | DIASTOLIC BLOOD PRESSURE: 83 MMHG | RESPIRATION RATE: 20 BRPM | HEART RATE: 90 BPM | TEMPERATURE: 98 F | WEIGHT: 315 LBS | OXYGEN SATURATION: 98 % | HEIGHT: 72 IN

## 2023-06-26 DIAGNOSIS — I10 HYPERTENSION, UNSPECIFIED TYPE: Primary | ICD-10-CM

## 2023-06-26 DIAGNOSIS — E78.5 HYPERLIPIDEMIA, UNSPECIFIED HYPERLIPIDEMIA TYPE: ICD-10-CM

## 2023-06-26 DIAGNOSIS — E78.1 HYPERTRIGLYCERIDEMIA: ICD-10-CM

## 2023-06-26 DIAGNOSIS — Z12.11 COLON CANCER SCREENING: ICD-10-CM

## 2023-06-26 DIAGNOSIS — Z82.49 FAMILY HISTORY OF HEART DISEASE: ICD-10-CM

## 2023-06-26 PROCEDURE — 3008F BODY MASS INDEX DOCD: CPT | Mod: CPTII,,, | Performed by: NURSE PRACTITIONER

## 2023-06-26 PROCEDURE — 1159F MED LIST DOCD IN RCRD: CPT | Mod: CPTII,,, | Performed by: NURSE PRACTITIONER

## 2023-06-26 PROCEDURE — 4010F PR ACE/ARB THEARPY RXD/TAKEN: ICD-10-PCS | Mod: CPTII,,, | Performed by: NURSE PRACTITIONER

## 2023-06-26 PROCEDURE — 99214 PR OFFICE/OUTPT VISIT, EST, LEVL IV, 30-39 MIN: ICD-10-PCS | Mod: ,,, | Performed by: NURSE PRACTITIONER

## 2023-06-26 PROCEDURE — 3074F PR MOST RECENT SYSTOLIC BLOOD PRESSURE < 130 MM HG: ICD-10-PCS | Mod: CPTII,,, | Performed by: NURSE PRACTITIONER

## 2023-06-26 PROCEDURE — 3008F PR BODY MASS INDEX (BMI) DOCUMENTED: ICD-10-PCS | Mod: CPTII,,, | Performed by: NURSE PRACTITIONER

## 2023-06-26 PROCEDURE — 1159F PR MEDICATION LIST DOCUMENTED IN MEDICAL RECORD: ICD-10-PCS | Mod: CPTII,,, | Performed by: NURSE PRACTITIONER

## 2023-06-26 PROCEDURE — 3079F PR MOST RECENT DIASTOLIC BLOOD PRESSURE 80-89 MM HG: ICD-10-PCS | Mod: CPTII,,, | Performed by: NURSE PRACTITIONER

## 2023-06-26 PROCEDURE — 1160F PR REVIEW ALL MEDS BY PRESCRIBER/CLIN PHARMACIST DOCUMENTED: ICD-10-PCS | Mod: CPTII,,, | Performed by: NURSE PRACTITIONER

## 2023-06-26 PROCEDURE — 3079F DIAST BP 80-89 MM HG: CPT | Mod: CPTII,,, | Performed by: NURSE PRACTITIONER

## 2023-06-26 PROCEDURE — 4010F ACE/ARB THERAPY RXD/TAKEN: CPT | Mod: CPTII,,, | Performed by: NURSE PRACTITIONER

## 2023-06-26 PROCEDURE — 1160F RVW MEDS BY RX/DR IN RCRD: CPT | Mod: CPTII,,, | Performed by: NURSE PRACTITIONER

## 2023-06-26 PROCEDURE — 3074F SYST BP LT 130 MM HG: CPT | Mod: CPTII,,, | Performed by: NURSE PRACTITIONER

## 2023-06-26 PROCEDURE — 99214 OFFICE O/P EST MOD 30 MIN: CPT | Mod: ,,, | Performed by: NURSE PRACTITIONER

## 2023-06-26 RX ORDER — ATORVASTATIN CALCIUM 40 MG/1
40 TABLET, FILM COATED ORAL DAILY
Qty: 90 TABLET | Refills: 1 | Status: SHIPPED | OUTPATIENT
Start: 2023-06-26

## 2023-06-26 RX ORDER — METOPROLOL SUCCINATE 25 MG/1
25 TABLET, EXTENDED RELEASE ORAL DAILY
Qty: 90 TABLET | Refills: 2 | Status: SHIPPED | OUTPATIENT
Start: 2023-06-26 | End: 2023-08-14 | Stop reason: SDUPTHER

## 2023-06-26 NOTE — PROGRESS NOTES
Subjective:      Patient ID: Tom Philip is a 43 y.o. Black or  male      Chief Complaint: Hypertension       Past Medical History:   Diagnosis Date    HLD (hyperlipidemia)     Hypertension     Hypertriglyceridemia     Morbid obesity         HPI  Presents to clinic for follow up HTN.    HTN:  HTN for many years.  BP elevated at previous visit.  Medication changes were made. Currently taking Metoprolol XL 25 mg po daily, Amlodipine 10 mg po daily, and Lisinopril/HCTZ 20-12.5 mg (two tablets) po daily.  BP at goal today.  Denies any headaches, weakness, dizziness, CP, or SOB.        HDL/Triglycerides: Total cholesterol 228; Triglycerides 600; LDL 73.  16.7% 10-year risk of heart attack and stroke per ACC/AHA guidelines; He has not yet started Atorvastatin or Fish Oil as previously recommended/prescribed       Alcohol use:  Drink 3 beers daily; Fany on weekends  Tobacco User:  Smokes cigarettes; 1 pack per week.  Denies any wheezing, cough, or SOB  Morbid Obesity:  States he exercises on weekend    Mother with Hx of CAD.  Desires Cardiology referral.        Review of Systems   Constitutional:  Negative for chills, fatigue, fever and unexpected weight change.   HENT: Negative.     Eyes: Negative.    Respiratory: Negative.  Negative for shortness of breath.    Cardiovascular: Negative.  Negative for chest pain.   Gastrointestinal: Negative.    Endocrine: Negative.    Genitourinary: Negative.    Musculoskeletal: Negative.    Integumentary:  Negative.   Allergic/Immunologic: Negative.    Neurological: Negative.  Negative for weakness.   Hematological: Negative.    Psychiatric/Behavioral: Negative.     All other systems reviewed and are negative.       Objective:      Vitals:    06/26/23 1519   BP: 129/83   Pulse: 90   Resp: 20   Temp: 97.9 °F (36.6 °C)      Body mass index is 42.72 kg/m².     Physical Exam  Vitals reviewed.   Constitutional:       Appearance: He is not toxic-appearing.   HENT:       Head: Normocephalic.      Mouth/Throat:      Mouth: Mucous membranes are moist.   Eyes:      Extraocular Movements: Extraocular movements intact.      Pupils: Pupils are equal, round, and reactive to light.   Cardiovascular:      Rate and Rhythm: Normal rate and regular rhythm.      Pulses: Normal pulses.      Heart sounds: Normal heart sounds.   Pulmonary:      Effort: Pulmonary effort is normal. No respiratory distress.      Breath sounds: Normal breath sounds.   Abdominal:      General: Bowel sounds are normal. There is no distension.      Palpations: Abdomen is soft.      Tenderness: There is no abdominal tenderness.   Musculoskeletal:         General: No tenderness. Normal range of motion.      Cervical back: Neck supple.   Skin:     General: Skin is warm and dry.   Neurological:      Mental Status: He is alert and oriented to person, place, and time.   Psychiatric:         Mood and Affect: Mood normal.          Current Outpatient Medications:     amLODIPine (NORVASC) 10 MG tablet, Take 1 tablet (10 mg total) by mouth once daily., Disp: 90 tablet, Rfl: 1    atorvastatin (LIPITOR) 40 MG tablet, Take 1 tablet (40 mg total) by mouth once daily., Disp: 90 tablet, Rfl: 1    lisinopriL-hydrochlorothiazide (PRINZIDE,ZESTORETIC) 20-12.5 mg per tablet, Take 2 tablets by mouth once daily., Disp: 180 tablet, Rfl: 1    metoprolol succinate (TOPROL-XL) 25 MG 24 hr tablet, Take 1 tablet (25 mg total) by mouth once daily., Disp: 90 tablet, Rfl: 2    Assessment & Plan:     Problem List Items Addressed This Visit          Cardiac/Vascular    HTN (hypertension) - Primary     BP at goal  Continue current medication  Daily BP check; bring log all clinic visits  Instructed to report to ED for any BP greater than 200/100, dizziness, syncope, CP, or SOB  Keep appt. With PCP for follow up  Patient is agreeable to plan and verbalizes understanding             Relevant Medications    metoprolol succinate (TOPROL-XL) 25 MG 24 hr tablet     Hypertriglyceridemia     Total cholesterol 228; Triglycerides 600; LDL 73.   16.7% 10-year risk of heart attack and stroke per ACC/AHA guidelines; Needs high intensity statin  Rx Atorvastatin 40 mg po daily  Start OTC Fish Oil 1200 mg BID  Repeat labs in 3 months;            Relevant Medications    atorvastatin (LIPITOR) 40 MG tablet    Hyperlipidemia     Total cholesterol 228; Triglycerides 600; LDL 73.   16.7% 10-year risk of heart attack and stroke per ACC/AHA guidelines; Needs high intensity statin  Rx Atorvastatin 40 mg po daily  Start OTC Fish Oil 1200 mg BID  Repeat labs in 3 months;            Relevant Medications    atorvastatin (LIPITOR) 40 MG tablet    Other Relevant Orders    Comprehensive Metabolic Panel    Lipid Panel    Family history of heart disease    Relevant Orders    Ambulatory referral/consult to Cardiology       GI    Colon cancer screening    Relevant Orders    Cologuard Screening (Multitarget Stool DNA)

## 2023-08-09 LAB — NONINV COLON CA DNA+OCC BLD SCRN STL QL: NEGATIVE

## 2023-08-14 ENCOUNTER — OFFICE VISIT (OUTPATIENT)
Dept: FAMILY MEDICINE | Facility: CLINIC | Age: 43
End: 2023-08-14
Payer: COMMERCIAL

## 2023-08-14 VITALS
DIASTOLIC BLOOD PRESSURE: 94 MMHG | TEMPERATURE: 98 F | RESPIRATION RATE: 19 BRPM | SYSTOLIC BLOOD PRESSURE: 140 MMHG | BODY MASS INDEX: 41.81 KG/M2 | WEIGHT: 308.69 LBS | HEART RATE: 98 BPM | HEIGHT: 72 IN | OXYGEN SATURATION: 96 %

## 2023-08-14 DIAGNOSIS — I10 HYPERTENSION, UNSPECIFIED TYPE: Primary | ICD-10-CM

## 2023-08-14 PROCEDURE — 1160F PR REVIEW ALL MEDS BY PRESCRIBER/CLIN PHARMACIST DOCUMENTED: ICD-10-PCS | Mod: CPTII,,, | Performed by: NURSE PRACTITIONER

## 2023-08-14 PROCEDURE — 99214 PR OFFICE/OUTPT VISIT, EST, LEVL IV, 30-39 MIN: ICD-10-PCS | Mod: ,,, | Performed by: NURSE PRACTITIONER

## 2023-08-14 PROCEDURE — 3080F PR MOST RECENT DIASTOLIC BLOOD PRESSURE >= 90 MM HG: ICD-10-PCS | Mod: CPTII,,, | Performed by: NURSE PRACTITIONER

## 2023-08-14 PROCEDURE — 4010F ACE/ARB THERAPY RXD/TAKEN: CPT | Mod: CPTII,,, | Performed by: NURSE PRACTITIONER

## 2023-08-14 PROCEDURE — 1160F RVW MEDS BY RX/DR IN RCRD: CPT | Mod: CPTII,,, | Performed by: NURSE PRACTITIONER

## 2023-08-14 PROCEDURE — 3008F BODY MASS INDEX DOCD: CPT | Mod: CPTII,,, | Performed by: NURSE PRACTITIONER

## 2023-08-14 PROCEDURE — 3008F PR BODY MASS INDEX (BMI) DOCUMENTED: ICD-10-PCS | Mod: CPTII,,, | Performed by: NURSE PRACTITIONER

## 2023-08-14 PROCEDURE — 1159F MED LIST DOCD IN RCRD: CPT | Mod: CPTII,,, | Performed by: NURSE PRACTITIONER

## 2023-08-14 PROCEDURE — 1159F PR MEDICATION LIST DOCUMENTED IN MEDICAL RECORD: ICD-10-PCS | Mod: CPTII,,, | Performed by: NURSE PRACTITIONER

## 2023-08-14 PROCEDURE — 99214 OFFICE O/P EST MOD 30 MIN: CPT | Mod: ,,, | Performed by: NURSE PRACTITIONER

## 2023-08-14 PROCEDURE — 4010F PR ACE/ARB THEARPY RXD/TAKEN: ICD-10-PCS | Mod: CPTII,,, | Performed by: NURSE PRACTITIONER

## 2023-08-14 PROCEDURE — 3077F PR MOST RECENT SYSTOLIC BLOOD PRESSURE >= 140 MM HG: ICD-10-PCS | Mod: CPTII,,, | Performed by: NURSE PRACTITIONER

## 2023-08-14 PROCEDURE — 3080F DIAST BP >= 90 MM HG: CPT | Mod: CPTII,,, | Performed by: NURSE PRACTITIONER

## 2023-08-14 PROCEDURE — 3077F SYST BP >= 140 MM HG: CPT | Mod: CPTII,,, | Performed by: NURSE PRACTITIONER

## 2023-08-14 RX ORDER — LISINOPRIL AND HYDROCHLOROTHIAZIDE 12.5; 2 MG/1; MG/1
2 TABLET ORAL DAILY
Qty: 180 TABLET | Refills: 1 | Status: SHIPPED | OUTPATIENT
Start: 2023-08-14

## 2023-08-14 RX ORDER — METOPROLOL SUCCINATE 25 MG/1
25 TABLET, EXTENDED RELEASE ORAL DAILY
Qty: 90 TABLET | Refills: 2 | Status: SHIPPED | OUTPATIENT
Start: 2023-08-14

## 2023-08-14 RX ORDER — AMLODIPINE BESYLATE 10 MG/1
10 TABLET ORAL DAILY
Qty: 90 TABLET | Refills: 1 | Status: SHIPPED | OUTPATIENT
Start: 2023-08-14

## 2023-08-14 NOTE — ASSESSMENT & PLAN NOTE
BP elevated; Asymptomatic  Medication management:  Did not take BP medications today; Currently taking Metoprolol XL 25 mg po daily, Amlodipine 10 mg po daily, and Lisinopril/HCTZ 20-12.5 mg (two tablets) po daily  Instructed to take BP meds prior to all clinic appointments  Instructed to report to ED for any BP >200/100, SOB, CP, and/or worsening symptoms  Daily BP check; bring log to all appointments  Keep appt for follow up  Verbalizes all understanding

## 2023-08-14 NOTE — PROGRESS NOTES
Subjective:      Patient ID: Tom Philip is a 43 y.o. Black or  male      Chief Complaint: Request antibiotics       Past Medical History:   Diagnosis Date    HLD (hyperlipidemia)     Hypertension     Hypertriglyceridemia     Morbid obesity         HPI  Presents to clinic for evaluation.    Pt is here today requesting antibiotics.  States his wife has yeast infection and he is here today for treatment.  Denies any penile erythema, penile lesions, urinary frequency, dysuria, or urgency.      HTN:  HTN for many years.  BP elevated today; normal at previous visit.  Currently taking Metoprolol XL 25 mg po daily, Amlodipine 10 mg po daily, and Lisinopril/HCTZ 20-12.5 mg (two tablets) po daily.  BP at goal today.  Denies any headaches, weakness, dizziness, CP, or SOB.        Chronic:      HDL/Triglycerides: Total cholesterol 228; Triglycerides 600; LDL 73.  16.7% 10-year risk of heart attack and stroke per ACC/AHA guidelines; On Statin.  Tolerating well.       Alcohol use:  Drink 3 beers daily; Fany on weekends  Tobacco User:  Smokes cigarettes; 1 pack per week.  Denies any wheezing, cough, or SOB  Morbid Obesity:  States he exercises on weekend     Mother with Hx of CAD.  Desires Cardiology referral.          Review of Systems   Constitutional:  Negative for chills, fatigue, fever and unexpected weight change.   HENT: Negative.     Eyes: Negative.    Respiratory: Negative.  Negative for shortness of breath.    Cardiovascular: Negative.  Negative for chest pain.   Gastrointestinal: Negative.    Endocrine: Negative.    Genitourinary: Negative.    Musculoskeletal: Negative.    Integumentary:  Negative.   Allergic/Immunologic: Negative.    Neurological: Negative.  Negative for weakness.   Hematological: Negative.    Psychiatric/Behavioral: Negative.     All other systems reviewed and are negative.         Objective:      Vitals:    08/14/23 0932   BP: (!) 140/94   Pulse:    Resp:    Temp:       Body  mass index is 41.87 kg/m².     Physical Exam  Vitals reviewed.   Constitutional:       Appearance: He is not toxic-appearing.   HENT:      Head: Normocephalic.      Mouth/Throat:      Mouth: Mucous membranes are moist.   Eyes:      Extraocular Movements: Extraocular movements intact.      Pupils: Pupils are equal, round, and reactive to light.   Cardiovascular:      Rate and Rhythm: Normal rate and regular rhythm.      Pulses: Normal pulses.      Heart sounds: Normal heart sounds.   Pulmonary:      Effort: Pulmonary effort is normal. No respiratory distress.      Breath sounds: Normal breath sounds.   Abdominal:      General: Bowel sounds are normal. There is no distension.      Palpations: Abdomen is soft.      Tenderness: There is no abdominal tenderness.   Musculoskeletal:         General: No tenderness. Normal range of motion.      Cervical back: Neck supple.   Skin:     General: Skin is warm and dry.   Neurological:      Mental Status: He is alert and oriented to person, place, and time.   Psychiatric:         Mood and Affect: Mood normal.            Current Outpatient Medications:     atorvastatin (LIPITOR) 40 MG tablet, Take 1 tablet (40 mg total) by mouth once daily., Disp: 90 tablet, Rfl: 1    amLODIPine (NORVASC) 10 MG tablet, Take 1 tablet (10 mg total) by mouth once daily., Disp: 90 tablet, Rfl: 1    lisinopriL-hydrochlorothiazide (PRINZIDE,ZESTORETIC) 20-12.5 mg per tablet, Take 2 tablets by mouth once daily., Disp: 180 tablet, Rfl: 1    metoprolol succinate (TOPROL-XL) 25 MG 24 hr tablet, Take 1 tablet (25 mg total) by mouth once daily., Disp: 90 tablet, Rfl: 2    Assessment & Plan:     Problem List Items Addressed This Visit          Cardiac/Vascular    Hypertension - Primary     BP elevated; Asymptomatic  Medication management:  Did not take BP medications today; Currently taking Metoprolol XL 25 mg po daily, Amlodipine 10 mg po daily, and Lisinopril/HCTZ 20-12.5 mg (two tablets) po daily  Instructed  to take BP meds prior to all clinic appointments  Instructed to report to ED for any BP >200/100, SOB, CP, and/or worsening symptoms  Daily BP check; bring log to all appointments  Keep appt for follow up  Verbalizes all understanding           Relevant Medications    amLODIPine (NORVASC) 10 MG tablet    lisinopriL-hydrochlorothiazide (PRINZIDE,ZESTORETIC) 20-12.5 mg per tablet    metoprolol succinate (TOPROL-XL) 25 MG 24 hr tablet            Pt denies any symptoms of penile candidiasis or STD's.  Offered STD testing (G/C, trichomonas); pt declines.

## 2023-12-06 ENCOUNTER — HOSPITAL ENCOUNTER (EMERGENCY)
Facility: HOSPITAL | Age: 43
Discharge: HOME | End: 2023-12-06
Attending: EMERGENCY MEDICINE
Payer: COMMERCIAL

## 2023-12-06 VITALS
BODY MASS INDEX: 40.31 KG/M2 | RESPIRATION RATE: 20 BRPM | SYSTOLIC BLOOD PRESSURE: 156 MMHG | WEIGHT: 297.63 LBS | DIASTOLIC BLOOD PRESSURE: 81 MMHG | HEART RATE: 92 BPM | HEIGHT: 72 IN | TEMPERATURE: 98 F | OXYGEN SATURATION: 99 %

## 2023-12-06 DIAGNOSIS — R05.1 ACUTE COUGH: ICD-10-CM

## 2023-12-06 DIAGNOSIS — R07.9 CHEST PAIN: Primary | ICD-10-CM

## 2023-12-06 LAB
ALBUMIN SERPL-MCNC: 4.3 G/DL (ref 3.5–5)
ALBUMIN/GLOB SERPL: 1.2 RATIO (ref 1.1–2)
ALP SERPL-CCNC: 37 UNIT/L (ref 40–150)
ALT SERPL-CCNC: 35 UNIT/L (ref 0–55)
AST SERPL-CCNC: 33 UNIT/L (ref 5–34)
BASOPHILS # BLD AUTO: 0.06 X10(3)/MCL
BASOPHILS NFR BLD AUTO: 1 %
BILIRUB SERPL-MCNC: 1.3 MG/DL
BNP BLD-MCNC: <10 PG/ML
BUN SERPL-MCNC: 7.8 MG/DL (ref 8.9–20.6)
CALCIUM SERPL-MCNC: 10.1 MG/DL (ref 8.4–10.2)
CHLORIDE SERPL-SCNC: 100 MMOL/L (ref 98–107)
CO2 SERPL-SCNC: 26 MMOL/L (ref 22–29)
CREAT SERPL-MCNC: 1.12 MG/DL (ref 0.73–1.18)
EOSINOPHIL # BLD AUTO: 0.03 X10(3)/MCL (ref 0–0.9)
EOSINOPHIL NFR BLD AUTO: 0.5 %
ERYTHROCYTE [DISTWIDTH] IN BLOOD BY AUTOMATED COUNT: 11.7 % (ref 11.5–17)
GFR SERPLBLD CREATININE-BSD FMLA CKD-EPI: >60 MLS/MIN/1.73/M2
GLOBULIN SER-MCNC: 3.6 GM/DL (ref 2.4–3.5)
GLUCOSE SERPL-MCNC: 111 MG/DL (ref 74–100)
HCT VFR BLD AUTO: 47.4 % (ref 42–52)
HGB BLD-MCNC: 16.8 G/DL (ref 14–18)
IMM GRANULOCYTES # BLD AUTO: 0 X10(3)/MCL (ref 0–0.04)
IMM GRANULOCYTES NFR BLD AUTO: 0 %
INR PPP: 1 (ref 2–3)
LYMPHOCYTES # BLD AUTO: 2.1 X10(3)/MCL (ref 0.6–4.6)
LYMPHOCYTES NFR BLD AUTO: 33.3 %
MCH RBC QN AUTO: 33.2 PG (ref 27–31)
MCHC RBC AUTO-ENTMCNC: 35.4 G/DL (ref 33–36)
MCV RBC AUTO: 93.7 FL (ref 80–94)
MONOCYTES # BLD AUTO: 0.52 X10(3)/MCL (ref 0.1–1.3)
MONOCYTES NFR BLD AUTO: 8.3 %
NEUTROPHILS # BLD AUTO: 3.59 X10(3)/MCL (ref 2.1–9.2)
NEUTROPHILS NFR BLD AUTO: 56.9 %
NRBC BLD AUTO-RTO: 0 %
PLATELET # BLD AUTO: 262 X10(3)/MCL (ref 130–400)
PMV BLD AUTO: 9.4 FL (ref 7.4–10.4)
POTASSIUM SERPL-SCNC: 3.4 MMOL/L (ref 3.5–5.1)
PROT SERPL-MCNC: 7.9 GM/DL (ref 6.4–8.3)
PROTHROMBIN TIME: 13 SECONDS (ref 11.7–14.5)
RBC # BLD AUTO: 5.06 X10(6)/MCL (ref 4.7–6.1)
SODIUM SERPL-SCNC: 138 MMOL/L (ref 136–145)
TROPONIN I SERPL-MCNC: 0.02 NG/ML (ref 0–0.04)
WBC # SPEC AUTO: 6.3 X10(3)/MCL (ref 4.5–11.5)

## 2023-12-06 PROCEDURE — 80053 COMPREHEN METABOLIC PANEL: CPT | Performed by: EMERGENCY MEDICINE

## 2023-12-06 PROCEDURE — 85610 PROTHROMBIN TIME: CPT | Performed by: EMERGENCY MEDICINE

## 2023-12-06 PROCEDURE — 93005 ELECTROCARDIOGRAM TRACING: CPT

## 2023-12-06 PROCEDURE — 99285 EMERGENCY DEPT VISIT HI MDM: CPT | Mod: 25

## 2023-12-06 PROCEDURE — 93010 EKG 12-LEAD: ICD-10-PCS | Mod: ,,, | Performed by: STUDENT IN AN ORGANIZED HEALTH CARE EDUCATION/TRAINING PROGRAM

## 2023-12-06 PROCEDURE — 84484 ASSAY OF TROPONIN QUANT: CPT | Performed by: EMERGENCY MEDICINE

## 2023-12-06 PROCEDURE — 93010 ELECTROCARDIOGRAM REPORT: CPT | Mod: ,,, | Performed by: STUDENT IN AN ORGANIZED HEALTH CARE EDUCATION/TRAINING PROGRAM

## 2023-12-06 PROCEDURE — 85025 COMPLETE CBC W/AUTO DIFF WBC: CPT | Performed by: EMERGENCY MEDICINE

## 2023-12-06 PROCEDURE — 25500020 PHARM REV CODE 255: Performed by: EMERGENCY MEDICINE

## 2023-12-06 PROCEDURE — 83880 ASSAY OF NATRIURETIC PEPTIDE: CPT | Performed by: EMERGENCY MEDICINE

## 2023-12-06 RX ORDER — IBUPROFEN 800 MG/1
800 TABLET ORAL EVERY 8 HOURS PRN
Qty: 20 TABLET | Refills: 0 | Status: SHIPPED | OUTPATIENT
Start: 2023-12-06

## 2023-12-06 RX ORDER — BENZONATATE 200 MG/1
200 CAPSULE ORAL 3 TIMES DAILY PRN
Qty: 20 CAPSULE | Refills: 0 | Status: SHIPPED | OUTPATIENT
Start: 2023-12-06 | End: 2023-12-16

## 2023-12-06 RX ORDER — TIZANIDINE 4 MG/1
4 TABLET ORAL EVERY 6 HOURS PRN
Qty: 20 TABLET | Refills: 0 | Status: SHIPPED | OUTPATIENT
Start: 2023-12-06 | End: 2023-12-16

## 2023-12-06 RX ADMIN — IOPAMIDOL 100 ML: 755 INJECTION, SOLUTION INTRAVENOUS at 01:12

## 2023-12-06 NOTE — Clinical Note
"Tom Rapp" Carrier was seen and treated in our emergency department on 12/6/2023.  He may return to work on 12/08/2023.       If you have any questions or concerns, please don't hesitate to call.      Tessie Thakkar MD"

## 2023-12-06 NOTE — ED PROVIDER NOTES
Encounter Date: 12/6/2023       History     Chief Complaint   Patient presents with    Chest Pain     Intermittent cp for 2 days     43-year-old male with a history of hypertension, hyperlipidemia, hypertriglyceridemia, morbid obesity complains of left-sided chest pain, intermittent for the last 2 days. Today pain started about 5 hours ago and has been constant and mild. Pain is worse with deep breath and he states he feels short of breath.  He has no fever or runny nose, but does complain of cough.  No syncope or hemoptysis.  No history of DVT or PE.        Review of patient's allergies indicates:  No Known Allergies  Past Medical History:   Diagnosis Date    HLD (hyperlipidemia)     Hypertension     Hypertriglyceridemia     Morbid obesity      Past Surgical History:   Procedure Laterality Date    LEG SURGERY Right      Family History   Problem Relation Age of Onset    Hypertension Mother     Diabetes Mother     Hypertension Father      Social History     Tobacco Use    Smoking status: Every Day     Current packs/day: 0.25     Types: Cigarettes    Smokeless tobacco: Never   Substance Use Topics    Alcohol use: Yes     Comment: 4 beers a day    Drug use: Never     Review of Systems   Respiratory:  Positive for shortness of breath.    Cardiovascular:  Positive for chest pain.   All other systems reviewed and are negative.      Physical Exam     Initial Vitals   BP Pulse Resp Temp SpO2   12/06/23 1211 12/06/23 1207 12/06/23 1207 12/06/23 1207 12/06/23 1207   (!) 148/101 92 20 98.4 °F (36.9 °C) 99 %      MAP       --                Physical Exam    Nursing note and vitals reviewed.  Constitutional: Vital signs are normal. He appears well-developed and well-nourished.   HENT:   Head: Normocephalic and atraumatic.   Mouth/Throat: Oropharynx is clear and moist.   Eyes: Pupils are equal, round, and reactive to light.   Neck: Neck supple. No JVD present.   Cardiovascular:  Normal rate, regular rhythm and normal heart  sounds.           Pulmonary/Chest: Breath sounds normal. No respiratory distress.   Abdominal: Abdomen is soft. There is no abdominal tenderness.   Musculoskeletal:         General: No edema.      Cervical back: Neck supple. No edema or erythema.     Lymphadenopathy:     He has no cervical adenopathy.   Neurological: He is alert and oriented to person, place, and time. No cranial nerve deficit. GCS score is 15. GCS eye subscore is 4. GCS verbal subscore is 5. GCS motor subscore is 6.   Skin: Skin is warm and dry. Capillary refill takes less than 2 seconds.         ED Course   Procedures  Labs Reviewed   COMPREHENSIVE METABOLIC PANEL - Abnormal; Notable for the following components:       Result Value    Potassium Level 3.4 (*)     Glucose Level 111 (*)     Blood Urea Nitrogen 7.8 (*)     Globulin 3.6 (*)     Alkaline Phosphatase 37 (*)     All other components within normal limits   PROTIME-INR - Abnormal; Notable for the following components:    INR 1.0 (*)     All other components within normal limits   CBC WITH DIFFERENTIAL - Abnormal; Notable for the following components:    MCH 33.2 (*)     All other components within normal limits   B-TYPE NATRIURETIC PEPTIDE - Normal   TROPONIN I - Normal   CBC W/ AUTO DIFFERENTIAL    Narrative:     The following orders were created for panel order CBC auto differential.  Procedure                               Abnormality         Status                     ---------                               -----------         ------                     CBC with Differential[[id number]]       Abnormal            Final result                 Please view results for these tests on the individual orders.     EKG Readings: (Independently Interpreted)   Initial Reading: No STEMI. Rhythm: Normal Sinus Rhythm. Heart Rate: 98. Ectopy: No Ectopy. ST Segments: Normal ST Segments. T Waves: Normal. Clinical Impression: Normal Sinus Rhythm Other Impression: Incomplete right bundle branch block        Imaging Results              CTA Chest Non-Coronary (PE Studies) (Final result)  Result time 12/06/23 14:13:38      Final result by Damien Sommers MD (12/06/23 14:13:38)                   Impression:      No CT evidence of pulmonary thromboembolism or acute findings in the chest.      Electronically signed by: Damien Sommers MD  Date:    12/06/2023  Time:    14:13               Narrative:    EXAMINATION:  CTA CHEST NON CORONARY (PE STUDIES)    CLINICAL HISTORY:  Chest pain    TECHNIQUE:  Axial CT images were obtained through the chest after IV administration of 100 cc Isovue contrast.  MIP, coronal and sagittal reconstructions submitted and interpreted.  Total .  Automated exposure control utilized.    COMPARISON:  None    FINDINGS:  No filling defects are in the main pulmonary artery or segmental branches.    No focal consolidation, pneumothorax or pleural effusion.    Heart is normal size.  Thoracic aorta is normal in caliber.  Central airways are clear.  Thyroid goiter is present.    No enlarged lymph nodes.    No acute osseous findings.    Visualized portions of the upper abdomen show no acute findings.                                       X-Ray Chest PA And Lateral (Final result)  Result time 12/06/23 12:44:05      Final result by Aj Tamayo MD (12/06/23 12:44:05)                   Impression:      No acute chest disease is identified.      Electronically signed by: Aj Tamayo  Date:    12/06/2023  Time:    12:44               Narrative:    EXAMINATION:  XR CHEST PA AND LATERAL    CLINICAL HISTORY:  Chest Pain;, .    FINDINGS:  No alveolar consolidation, effusion, or pneumothorax is seen.   The thoracic aorta is normal  cardiac silhouette, central pulmonary vessels and mediastinum are normal in size and are grossly unremarkable.   visualized osseous structures are grossly unremarkable.                                       Medications   iopamidoL (ISOVUE-370) injection 100 mL (100  mLs Intravenous Given 12/6/23 1345)     Medical Decision Making  43-year-old male with a history of hypertension, hyperlipidemia, hypertriglyceridemia, morbid obesity complains of left-sided chest pain, intermittent for the last 2 days. Today pain started about 5 hours ago and has been constant and mild. Pain is worse with deep breath and he states he feels short of breath.  He has no fever cough, runny nose.  No syncope or hemoptysis.  No history of DVT or PE.      Differential diagnosis includes but is not limited to acute coronary syndrome, aortic dissection, pulmonary embolism, pneumonia, pneumothorax, lung tumor, reflux, anxiety, chest wall pain    Amount and/or Complexity of Data Reviewed  Labs: ordered. Decision-making details documented in ED Course.  Radiology: ordered. Decision-making details documented in ED Course.  Discussion of management or test interpretation with external provider(s): Patient was seen and evaluated in the emergency department with history, physical exam, lab work, EKG, chest x-ray, and chest CT.  His workup is completely benign.  He is asymptomatic at this time.  Blood pressure is mildly elevated but he states this is actually good for him.  He will need to follow-up with his PCP for further evaluation and treatment probably needs to have a stress test due to his multiple risk factors for heart disease.    Risk  Prescription drug management.               ED Course as of 12/07/23 2030   Wed Dec 06, 2023   1340 X-Ray Chest PA And Lateral  Chest x-ray is negative [SH]   1340 BNP: <10.0 [SH]   1340 Troponin I: 0.021 [SH]   1340 WBC: 6.30 [SH]   1340 Hemoglobin: 16.8 [SH]   1340 Hematocrit: 47.4 [SH]   1340 Platelet Count: 262 [SH]   1340 Sodium: 138 [SH]   1340 BUN(!): 7.8 [SH]   1340 Creatinine: 1.12 [SH]      ED Course User Index  [SH] Tessie Thakkar MD                           Clinical Impression:  Final diagnoses:  [R07.9] Chest pain (Primary)  [R05.1] Acute cough           ED Disposition Condition    Discharge Stable          ED Prescriptions       Medication Sig Dispense Start Date End Date Auth. Provider    tiZANidine (ZANAFLEX) 4 MG tablet Take 1 tablet (4 mg total) by mouth every 6 (six) hours as needed (muscle spasm). 20 tablet 12/6/2023 12/16/2023 Tessie Thakkar MD    ibuprofen (ADVIL,MOTRIN) 800 MG tablet Take 1 tablet (800 mg total) by mouth every 8 (eight) hours as needed for Pain. 20 tablet 12/6/2023 -- Tessie Thakkar MD    benzonatate (TESSALON) 200 MG capsule Take 1 capsule (200 mg total) by mouth 3 (three) times daily as needed for Cough. 20 capsule 12/6/2023 12/16/2023 Tessie Thakkar MD          Follow-up Information       Follow up With Specialties Details Why Contact Info    Mykel Sierra MD Family Medicine Schedule an appointment as soon as possible for a visit   [address] [address]  [phone]               Tessie Thakkar MD  12/07/23 8616

## 2023-12-06 NOTE — Clinical Note
cedric casillas accompanied their caregiver to the emergency department on 12/6/2023. They may return to work on 12/07/2023.      If you have any questions or concerns, please don't hesitate to call.       LILLIAN

## 2023-12-06 NOTE — Clinical Note
"Tom Rapp" Carrier was seen and treated in our emergency department on 12/6/2023.  He may return to work on 12/07/2023.       If you have any questions or concerns, please don't hesitate to call.       RN    " 3

## 2024-07-10 DIAGNOSIS — I10 HYPERTENSION, UNSPECIFIED TYPE: ICD-10-CM

## 2024-07-10 RX ORDER — LISINOPRIL AND HYDROCHLOROTHIAZIDE 12.5; 2 MG/1; MG/1
2 TABLET ORAL DAILY
Qty: 180 TABLET | Refills: 0 | OUTPATIENT
Start: 2024-07-10

## 2024-07-11 ENCOUNTER — TELEPHONE (OUTPATIENT)
Dept: FAMILY MEDICINE | Facility: CLINIC | Age: 44
End: 2024-07-11
Payer: COMMERCIAL

## 2024-07-11 DIAGNOSIS — I10 HYPERTENSION, UNSPECIFIED TYPE: ICD-10-CM

## 2024-07-11 RX ORDER — LISINOPRIL AND HYDROCHLOROTHIAZIDE 12.5; 2 MG/1; MG/1
2 TABLET ORAL DAILY
Qty: 14 TABLET | Refills: 0 | Status: SHIPPED | OUTPATIENT
Start: 2024-07-11 | End: 2024-07-18

## 2024-07-11 NOTE — TELEPHONE ENCOUNTER
Patient requesting lisinopril refill       Faiza did explain clinic expectations to patient about keeping appointments to ensure refills can be sent, and scheduled him with Sangeetha next week. Can we fill until that date? Scheduled for wednesday 7/17.     Faiza notified patient that we would ask provider for small refill until appt, and also explained that if appt is missed, we will no longer refill until appointment is completed. Patient verbalized understanding

## 2024-07-11 NOTE — TELEPHONE ENCOUNTER
This is a former ancelet patient that estela has been seeing until he established with you. He cancelles with you 12/11/23 and estela 12/12/23. He does not have an appt scheduled with you or estela to follow up    Calling to request bp meds. Please advise

## 2024-07-11 NOTE — TELEPHONE ENCOUNTER
I have signed for the following orders AND/OR meds.  Please call the patient and ask the patient to schedule the testing AND/OR inform about any medications that were sent.      Medications Ordered This Encounter   Medications    lisinopriL-hydrochlorothiazide (PRINZIDE,ZESTORETIC) 20-12.5 mg per tablet     Sig: Take 2 tablets by mouth once daily. for 7 days     Dispense:  14 tablet     Refill:  0     .

## 2024-07-11 NOTE — TELEPHONE ENCOUNTER
----- Message from Suzanne Gupta sent at 7/11/2024  9:42 AM CDT -----  Regarding: refill  Who Called: Tom Philip    Refill or New Rx:Refill  RX Name and Strength:lisinopriL-hydrochlorothiazide (PRINZIDE,ZESTORETIC) 20-12.5 mg per tablet  How is the patient currently taking it? (ex. 1XDay):1x day  Is this a 30 day or 90 day RX:90  Local or Mail Order:local  List of preferred pharmacies on file (remove unneeded): [unfilled]  If different Pharmacy is requested, enter Pharmacy information here including location and phone number:  [facility] [address] [facility], [place] - [address]    Ordering Provider:      Preferred Method of Contact: Phone Call  Patient's Preferred Phone Number on File: [phone]   Best Call Back Number, if different:  Additional Information: pt states he's been without meds for 3 days and is unsure why previous refill request was denied

## 2024-07-11 NOTE — TELEPHONE ENCOUNTER
What meds is he needing refills? All?   If he has not established with pcp (only seen estela since dr. Sierra has left) then he needs to establish with one of us.  Needs to have appt and attend appt.

## 2024-07-17 ENCOUNTER — OFFICE VISIT (OUTPATIENT)
Dept: FAMILY MEDICINE | Facility: CLINIC | Age: 44
End: 2024-07-17
Payer: COMMERCIAL

## 2024-07-17 VITALS
HEART RATE: 89 BPM | WEIGHT: 293 LBS | RESPIRATION RATE: 19 BRPM | OXYGEN SATURATION: 97 % | BODY MASS INDEX: 39.68 KG/M2 | DIASTOLIC BLOOD PRESSURE: 87 MMHG | SYSTOLIC BLOOD PRESSURE: 136 MMHG | TEMPERATURE: 98 F | HEIGHT: 72 IN

## 2024-07-17 DIAGNOSIS — E78.2 MIXED HYPERLIPIDEMIA: ICD-10-CM

## 2024-07-17 DIAGNOSIS — I10 PRIMARY HYPERTENSION: Primary | ICD-10-CM

## 2024-07-17 DIAGNOSIS — Z00.00 WELLNESS EXAMINATION: ICD-10-CM

## 2024-07-17 DIAGNOSIS — E78.1 HYPERTRIGLYCERIDEMIA: ICD-10-CM

## 2024-07-17 DIAGNOSIS — I10 HYPERTENSION, UNSPECIFIED TYPE: ICD-10-CM

## 2024-07-17 DIAGNOSIS — N52.9 ERECTILE DYSFUNCTION, UNSPECIFIED ERECTILE DYSFUNCTION TYPE: ICD-10-CM

## 2024-07-17 PROCEDURE — 3008F BODY MASS INDEX DOCD: CPT | Mod: CPTII,,, | Performed by: NURSE PRACTITIONER

## 2024-07-17 PROCEDURE — 4010F ACE/ARB THERAPY RXD/TAKEN: CPT | Mod: CPTII,,, | Performed by: NURSE PRACTITIONER

## 2024-07-17 PROCEDURE — 99214 OFFICE O/P EST MOD 30 MIN: CPT | Mod: ,,, | Performed by: NURSE PRACTITIONER

## 2024-07-17 PROCEDURE — 1159F MED LIST DOCD IN RCRD: CPT | Mod: CPTII,,, | Performed by: NURSE PRACTITIONER

## 2024-07-17 PROCEDURE — 3075F SYST BP GE 130 - 139MM HG: CPT | Mod: CPTII,,, | Performed by: NURSE PRACTITIONER

## 2024-07-17 PROCEDURE — 3079F DIAST BP 80-89 MM HG: CPT | Mod: CPTII,,, | Performed by: NURSE PRACTITIONER

## 2024-07-17 PROCEDURE — 1160F RVW MEDS BY RX/DR IN RCRD: CPT | Mod: CPTII,,, | Performed by: NURSE PRACTITIONER

## 2024-07-17 RX ORDER — LISINOPRIL AND HYDROCHLOROTHIAZIDE 12.5; 2 MG/1; MG/1
2 TABLET ORAL DAILY
Qty: 180 TABLET | Refills: 3 | Status: SHIPPED | OUTPATIENT
Start: 2024-07-17 | End: 2025-07-17

## 2024-07-17 RX ORDER — SILDENAFIL 50 MG/1
50 TABLET, FILM COATED ORAL DAILY PRN
Qty: 30 TABLET | Refills: 0 | Status: SHIPPED | OUTPATIENT
Start: 2024-07-17 | End: 2025-07-17

## 2024-07-17 NOTE — ASSESSMENT & PLAN NOTE
BP at goal  Continue current medication (Amlodipine, Metoprolol, Lisinopril-HCTZ)  Daily BP check; bring log all clinic visits  Instructed to report to ED for any BP greater than 200/100, dizziness, syncope, CP, or SOB  Keep appt. With PCP for follow up  Patient is agreeable to plan and verbalizes understanding

## 2024-07-17 NOTE — PROGRESS NOTES
Subjective:      Patient ID: Tom Philip is a 44 y.o. Black or  male      Chief Complaint: Medication Refill       Past Medical History:   Diagnosis Date    Erectile dysfunction 07/17/2024    HLD (hyperlipidemia)     Hypertension     Hypertriglyceridemia     Morbid obesity     Tobacco user         HPI  Presents to clinic for medication refills.  Previous patient of Dr. Barrera.      C/O of erectile dysfunction for 6 months.  Started after BP medication was increased.  Has difficulties initiating and maintaining an erection.  Denies testicular lesions or any other penile problems.     HTN:  HTN for many years.  BP at goal.  Currently taking Metoprolol XL 25 mg po daily, Amlodipine 10 mg po daily, and Lisinopril/HCTZ 20-12.5 mg (two tablets) po daily. Needs refills of Lisinopril/HCTZ.  Denies any headaches, weakness, dizziness, CP, or SOB.         HDL/Triglycerides: On Statin.  Tolerating well.       Alcohol use:  Drink 3 beers daily; Fany on weekends  Tobacco User:  Smokes cigarettes; 1 pack per week.  Denies any wheezing, cough, or SOB  Morbid Obesity:  States he exercises on weekend     Mother with Hx of CAD.  Previously referred to Cardiology.            Patient Care Team:  Mykel Sierra MD as PCP - General (Family Medicine)      Review of Systems   Constitutional:  Negative for chills, fatigue, fever and unexpected weight change.   HENT: Negative.     Eyes: Negative.    Respiratory: Negative.  Negative for shortness of breath.    Cardiovascular: Negative.  Negative for chest pain.   Gastrointestinal: Negative.    Endocrine: Negative.    Genitourinary: Negative.    Musculoskeletal: Negative.    Integumentary:  Negative.   Allergic/Immunologic: Negative.    Neurological: Negative.  Negative for weakness.   Hematological: Negative.    Psychiatric/Behavioral: Negative.     All other systems reviewed and are negative.          Objective:      Vitals:    07/17/24 1451   BP: 136/87   Pulse: 89    Resp: 19   Temp: 98.2 °F (36.8 °C)      Body mass index is 39.74 kg/m².     Physical Exam  Vitals reviewed.   Constitutional:       Appearance: He is not toxic-appearing.   HENT:      Head: Normocephalic.      Mouth/Throat:      Mouth: Mucous membranes are moist.   Eyes:      Extraocular Movements: Extraocular movements intact.      Pupils: Pupils are equal, round, and reactive to light.   Cardiovascular:      Rate and Rhythm: Normal rate and regular rhythm.      Pulses: Normal pulses.      Heart sounds: Normal heart sounds.   Pulmonary:      Effort: Pulmonary effort is normal. No respiratory distress.      Breath sounds: Normal breath sounds.   Abdominal:      General: Bowel sounds are normal. There is no distension.      Palpations: Abdomen is soft.      Tenderness: There is no abdominal tenderness.   Musculoskeletal:         General: No tenderness. Normal range of motion.      Cervical back: Neck supple.   Skin:     General: Skin is warm and dry.   Neurological:      Mental Status: He is alert and oriented to person, place, and time.   Psychiatric:         Mood and Affect: Mood normal.            Current Outpatient Medications:     amLODIPine (NORVASC) 10 MG tablet, Take 1 tablet (10 mg total) by mouth once daily., Disp: 90 tablet, Rfl: 1    atorvastatin (LIPITOR) 40 MG tablet, Take 1 tablet (40 mg total) by mouth once daily., Disp: 90 tablet, Rfl: 1    ibuprofen (ADVIL,MOTRIN) 800 MG tablet, Take 1 tablet (800 mg total) by mouth every 8 (eight) hours as needed for Pain., Disp: 20 tablet, Rfl: 0    metoprolol succinate (TOPROL-XL) 25 MG 24 hr tablet, Take 1 tablet (25 mg total) by mouth once daily., Disp: 90 tablet, Rfl: 2    lisinopriL-hydrochlorothiazide (PRINZIDE,ZESTORETIC) 20-12.5 mg per tablet, Take 2 tablets by mouth once daily., Disp: 180 tablet, Rfl: 3    sildenafiL (VIAGRA) 50 MG tablet, Take 1 tablet (50 mg total) by mouth daily as needed for Erectile Dysfunction. Take 1 hours before activity, Disp:  30 tablet, Rfl: 0    Assessment & Plan:     Problem List Items Addressed This Visit          Cardiac/Vascular    HTN (hypertension) - Primary     BP at goal  Continue current medication (Amlodipine, Metoprolol, Lisinopril-HCTZ)  Daily BP check; bring log all clinic visits  Instructed to report to ED for any BP greater than 200/100, dizziness, syncope, CP, or SOB  Keep appt. With PCP for follow up  Patient is agreeable to plan and verbalizes understanding            Relevant Medications    lisinopriL-hydrochlorothiazide (PRINZIDE,ZESTORETIC) 20-12.5 mg per tablet    Hypertension    Relevant Medications    lisinopriL-hydrochlorothiazide (PRINZIDE,ZESTORETIC) 20-12.5 mg per tablet    Hypertriglyceridemia     Stable  Continue Statin  Keep appt with PCP for follow up           Hyperlipidemia     Stable  Continue Statin  Keep appt with PCP for follow up              Renal/    Erectile dysfunction     Rx Viagra  F/U if no improvement  Report to ED for any erection lasting >4 hours  Keep appt with PCP for follow up         Relevant Medications    sildenafiL (VIAGRA) 50 MG tablet     Other Visit Diagnoses       Wellness examination        Relevant Orders    CBC Auto Differential    Comprehensive Metabolic Panel    Lipid Panel    TSH    Hemoglobin A1C    Urinalysis    Hepatitis C Antibody

## 2024-07-17 NOTE — ASSESSMENT & PLAN NOTE
Rx Viagra  F/U if no improvement  Report to ED for any erection lasting >4 hours  Keep appt with PCP for follow up

## 2024-09-29 DIAGNOSIS — I10 HYPERTENSION, UNSPECIFIED TYPE: ICD-10-CM

## 2024-09-30 RX ORDER — METOPROLOL SUCCINATE 25 MG/1
25 TABLET, EXTENDED RELEASE ORAL
Qty: 90 TABLET | Refills: 0 | Status: SHIPPED | OUTPATIENT
Start: 2024-09-30

## 2024-10-03 DIAGNOSIS — I10 HYPERTENSION, UNSPECIFIED TYPE: ICD-10-CM

## 2024-10-03 RX ORDER — AMLODIPINE BESYLATE 10 MG/1
10 TABLET ORAL
Qty: 90 TABLET | Refills: 0 | Status: SHIPPED | OUTPATIENT
Start: 2024-10-03

## 2024-10-03 NOTE — TELEPHONE ENCOUNTER
Covering for felicitas reeves while out.   This is a dr. Sierra patient I have not seen.  He has appt to establish care with me 10/24/24  Has seen FELICITAS reeves in dr. Sirera's absence. Lov with felicitas reeves 7/17/24    Will approve refill, but patient will need to be seen before further refills approved.           I have signed for the following orders AND/OR meds.  Please call the patient and ask the patient to schedule the testing AND/OR inform about any medications that were sent.      Medications Ordered This Encounter   Medications    amLODIPine (NORVASC) 10 MG tablet     Sig: Take 1 tablet by mouth once daily     Dispense:  90 tablet     Refill:  0     .

## 2024-10-24 PROBLEM — E78.1 HYPERTRIGLYCERIDEMIA: Status: RESOLVED | Noted: 2022-08-22 | Resolved: 2024-10-24

## 2024-10-24 PROBLEM — I10 HTN (HYPERTENSION): Status: RESOLVED | Noted: 2022-07-31 | Resolved: 2024-10-24

## 2024-11-14 DIAGNOSIS — N52.9 ERECTILE DYSFUNCTION, UNSPECIFIED ERECTILE DYSFUNCTION TYPE: ICD-10-CM

## 2024-11-18 RX ORDER — SILDENAFIL 50 MG/1
TABLET, FILM COATED ORAL
Qty: 30 TABLET | Refills: 0 | Status: SHIPPED | OUTPATIENT
Start: 2024-11-18

## 2024-12-24 DIAGNOSIS — I10 HYPERTENSION, UNSPECIFIED TYPE: ICD-10-CM

## 2024-12-24 RX ORDER — METOPROLOL SUCCINATE 25 MG/1
25 TABLET, EXTENDED RELEASE ORAL
Qty: 90 TABLET | Refills: 0 | Status: SHIPPED | OUTPATIENT
Start: 2024-12-24

## 2025-07-08 DIAGNOSIS — I10 HTN (HYPERTENSION): Primary | ICD-10-CM
